# Patient Record
Sex: FEMALE | Race: OTHER | HISPANIC OR LATINO
[De-identification: names, ages, dates, MRNs, and addresses within clinical notes are randomized per-mention and may not be internally consistent; named-entity substitution may affect disease eponyms.]

---

## 2019-03-04 PROBLEM — Z00.00 ENCOUNTER FOR PREVENTIVE HEALTH EXAMINATION: Status: ACTIVE | Noted: 2019-03-04

## 2019-04-04 ENCOUNTER — ASOB RESULT (OUTPATIENT)
Age: 29
End: 2019-04-04

## 2019-04-04 ENCOUNTER — APPOINTMENT (OUTPATIENT)
Dept: ANTEPARTUM | Facility: CLINIC | Age: 29
End: 2019-04-04
Payer: COMMERCIAL

## 2019-04-04 PROCEDURE — 76801 OB US < 14 WKS SINGLE FETUS: CPT

## 2019-04-04 PROCEDURE — 36416 COLLJ CAPILLARY BLOOD SPEC: CPT

## 2019-04-04 PROCEDURE — 76813 OB US NUCHAL MEAS 1 GEST: CPT

## 2019-06-06 ENCOUNTER — APPOINTMENT (OUTPATIENT)
Dept: ANTEPARTUM | Facility: CLINIC | Age: 29
End: 2019-06-06
Payer: COMMERCIAL

## 2019-06-06 ENCOUNTER — ASOB RESULT (OUTPATIENT)
Age: 29
End: 2019-06-06

## 2019-06-06 PROCEDURE — 76811 OB US DETAILED SNGL FETUS: CPT

## 2019-06-11 ENCOUNTER — APPOINTMENT (OUTPATIENT)
Dept: ANTEPARTUM | Facility: CLINIC | Age: 29
End: 2019-06-11
Payer: COMMERCIAL

## 2019-06-11 ENCOUNTER — ASOB RESULT (OUTPATIENT)
Age: 29
End: 2019-06-11

## 2019-06-11 PROCEDURE — 99213 OFFICE O/P EST LOW 20 MIN: CPT | Mod: 25

## 2019-06-11 PROCEDURE — 76816 OB US FOLLOW-UP PER FETUS: CPT

## 2019-07-17 ENCOUNTER — EMERGENCY (EMERGENCY)
Facility: HOSPITAL | Age: 29
LOS: 1 days | Discharge: HOME | End: 2019-07-17
Attending: EMERGENCY MEDICINE | Admitting: EMERGENCY MEDICINE
Payer: COMMERCIAL

## 2019-07-17 VITALS
DIASTOLIC BLOOD PRESSURE: 82 MMHG | HEART RATE: 99 BPM | TEMPERATURE: 98 F | OXYGEN SATURATION: 100 % | SYSTOLIC BLOOD PRESSURE: 146 MMHG | RESPIRATION RATE: 18 BRPM

## 2019-07-17 PROCEDURE — 93010 ELECTROCARDIOGRAM REPORT: CPT

## 2019-07-17 PROCEDURE — 99284 EMERGENCY DEPT VISIT MOD MDM: CPT | Mod: 25

## 2019-07-17 NOTE — ED ADULT TRIAGE NOTE - CHIEF COMPLAINT QUOTE
PT C/O chest pressure x 1 day. Denies SOB, fevers, chills, cough, ABD pain, vaginal bleeding or OBGYN complaints. PMH. States approximately 27 weeks pregnant with DELPHINE: 10/13/19. [facility]. Spoke with L&D triage PA: Pt to be seen in ED.

## 2019-07-18 ENCOUNTER — OUTPATIENT (OUTPATIENT)
Dept: INPATIENT UNIT | Facility: HOSPITAL | Age: 29
LOS: 1 days | Discharge: HOME | End: 2019-07-18
Payer: COMMERCIAL

## 2019-07-18 VITALS
SYSTOLIC BLOOD PRESSURE: 122 MMHG | OXYGEN SATURATION: 100 % | HEART RATE: 78 BPM | TEMPERATURE: 98 F | RESPIRATION RATE: 18 BRPM | DIASTOLIC BLOOD PRESSURE: 71 MMHG

## 2019-07-18 VITALS — DIASTOLIC BLOOD PRESSURE: 66 MMHG | SYSTOLIC BLOOD PRESSURE: 112 MMHG | HEART RATE: 80 BPM

## 2019-07-18 VITALS — SYSTOLIC BLOOD PRESSURE: 134 MMHG | HEART RATE: 82 BPM | DIASTOLIC BLOOD PRESSURE: 68 MMHG

## 2019-07-18 DIAGNOSIS — O26.899 OTHER SPECIFIED PREGNANCY RELATED CONDITIONS, UNSPECIFIED TRIMESTER: ICD-10-CM

## 2019-07-18 DIAGNOSIS — Z3A.00 WEEKS OF GESTATION OF PREGNANCY NOT SPECIFIED: ICD-10-CM

## 2019-07-18 LAB
ALBUMIN SERPL ELPH-MCNC: 3.8 G/DL — SIGNIFICANT CHANGE UP (ref 3.3–5)
ALP SERPL-CCNC: 60 U/L — SIGNIFICANT CHANGE UP (ref 40–120)
ALT FLD-CCNC: 19 U/L — SIGNIFICANT CHANGE UP (ref 4–33)
ANION GAP SERPL CALC-SCNC: 12 MMO/L — SIGNIFICANT CHANGE UP (ref 7–14)
APPEARANCE UR: CLEAR — SIGNIFICANT CHANGE UP
AST SERPL-CCNC: 16 U/L — SIGNIFICANT CHANGE UP (ref 4–32)
BILIRUB SERPL-MCNC: < 0.2 MG/DL — LOW (ref 0.2–1.2)
BILIRUB UR-MCNC: NEGATIVE — SIGNIFICANT CHANGE UP
BLOOD UR QL VISUAL: NEGATIVE — SIGNIFICANT CHANGE UP
BUN SERPL-MCNC: 8 MG/DL — SIGNIFICANT CHANGE UP (ref 7–23)
CALCIUM SERPL-MCNC: 9.7 MG/DL — SIGNIFICANT CHANGE UP (ref 8.4–10.5)
CHLORIDE SERPL-SCNC: 101 MMOL/L — SIGNIFICANT CHANGE UP (ref 98–107)
CO2 SERPL-SCNC: 23 MMOL/L — SIGNIFICANT CHANGE UP (ref 22–31)
COLOR SPEC: SIGNIFICANT CHANGE UP
CREAT SERPL-MCNC: 0.53 MG/DL — SIGNIFICANT CHANGE UP (ref 0.5–1.3)
GLUCOSE SERPL-MCNC: 86 MG/DL — SIGNIFICANT CHANGE UP (ref 70–99)
GLUCOSE UR-MCNC: NEGATIVE — SIGNIFICANT CHANGE UP
HCT VFR BLD CALC: 37.9 % — SIGNIFICANT CHANGE UP (ref 34.5–45)
HGB BLD-MCNC: 12.6 G/DL — SIGNIFICANT CHANGE UP (ref 11.5–15.5)
KETONES UR-MCNC: NEGATIVE — SIGNIFICANT CHANGE UP
LEUKOCYTE ESTERASE UR-ACNC: NEGATIVE — SIGNIFICANT CHANGE UP
MCHC RBC-ENTMCNC: 30.1 PG — SIGNIFICANT CHANGE UP (ref 27–34)
MCHC RBC-ENTMCNC: 33.2 % — SIGNIFICANT CHANGE UP (ref 32–36)
MCV RBC AUTO: 90.7 FL — SIGNIFICANT CHANGE UP (ref 80–100)
NITRITE UR-MCNC: NEGATIVE — SIGNIFICANT CHANGE UP
NRBC # FLD: 0 K/UL — SIGNIFICANT CHANGE UP (ref 0–0)
PH UR: 6.5 — SIGNIFICANT CHANGE UP (ref 5–8)
PLATELET # BLD AUTO: 287 K/UL — SIGNIFICANT CHANGE UP (ref 150–400)
PMV BLD: 10.9 FL — SIGNIFICANT CHANGE UP (ref 7–13)
POTASSIUM SERPL-MCNC: 3.4 MMOL/L — LOW (ref 3.5–5.3)
POTASSIUM SERPL-SCNC: 3.4 MMOL/L — LOW (ref 3.5–5.3)
PROT SERPL-MCNC: 7.4 G/DL — SIGNIFICANT CHANGE UP (ref 6–8.3)
PROT UR-MCNC: 10 — SIGNIFICANT CHANGE UP
RBC # BLD: 4.18 M/UL — SIGNIFICANT CHANGE UP (ref 3.8–5.2)
RBC # FLD: 13 % — SIGNIFICANT CHANGE UP (ref 10.3–14.5)
SODIUM SERPL-SCNC: 136 MMOL/L — SIGNIFICANT CHANGE UP (ref 135–145)
SP GR SPEC: 1.02 — SIGNIFICANT CHANGE UP (ref 1–1.04)
TROPONIN T, HIGH SENSITIVITY: < 6 NG/L — SIGNIFICANT CHANGE UP (ref ?–14)
UROBILINOGEN FLD QL: NORMAL — SIGNIFICANT CHANGE UP
WBC # BLD: 13.57 K/UL — HIGH (ref 3.8–10.5)
WBC # FLD AUTO: 13.57 K/UL — HIGH (ref 3.8–10.5)

## 2019-07-18 PROCEDURE — 59025 FETAL NON-STRESS TEST: CPT | Mod: 26

## 2019-07-18 NOTE — ED PROVIDER NOTE - NSFOLLOWUPCLINICS_GEN_ALL_ED_FT
A Family Medicine Doctor  Family Medicine  .  [place]   Phone:   Fax:   Follow Up Time: 1-3 Days    An OB/GYN physician  Obstetrics & Gynecology  .  [place]   Phone:   Fax:   Follow Up Time: 1-3 Days

## 2019-07-18 NOTE — OB PROVIDER TRIAGE NOTE - NSOBPROVIDERNOTE_OBGYN_ALL_OB_FT
Discussed with Dr. CASTELLANO  -Pt cleared for discharge home  -PTL precautions reviewed  -Pt to follow up with next scheduled appointment  -Verbal and written discharge instructions given Discussed with Dr. Abdi  -Pt cleared for discharge home  -PTL precautions reviewed  -Pt to follow up with next scheduled appointment  -Verbal and written discharge instructions given

## 2019-07-18 NOTE — ED PROVIDER NOTE - PROGRESS NOTE DETAILS
shared decision making with patient and  about cxr, patient would not like cxr, advised on risks and benefits of diagnostic cxr, and understands.  pending remaining labs Patient feels well, tolerating PO. Discussed lab and radiology findings with patient. Patient feels comfortable going home. Gave home care and follow up instructions. Discussed which symptoms to look out for and when to return to the ED for further evaluation. Patient given opportunity to ask questions about their medical condition and had all questions answered. will send to ob for nst

## 2019-07-18 NOTE — ED PROVIDER NOTE - NS ED ROS FT
ROS:  GENERAL: No fever, no chills  EYES: no change in vision  HEENT: no trouble swallowing, no trouble speaking  CARDIAC: + chest pain  PULMONARY: no cough, no shortness of breath  GI: no abdominal pain, no nausea, no vomiting, no diarrhea, no constipation  : No dysuria, no frequency, no change in appearance, or odor of urine  SKIN: no rashes  NEURO: no headache, no weakness  MSK: No joint pain  ~Jason Rushing D.O. -Resident

## 2019-07-18 NOTE — ED PROVIDER NOTE - OBJECTIVE STATEMENT
29 yo f  27wk's lmp dec 19th no pmhx pw chest pain. patient reports acute onset chest pain starting at 8pm in midsternal area. does not radiate and was constant. denies hx of chest pain. not aw Dyspnea or palpitations. Denies fever, chills, sweats, fatigue, weight loss, Alcohol, tobacco, or illicit drug use. Episode lasted approx 1hr in duration. no hx of Vascular or pulmonary disease, history of ulcer, previous hospitalizations or evaluations for chest pain. denies OCP use, recent travel, hx of blood clots, hx of cancer. now pain free

## 2019-07-18 NOTE — ED ADULT NURSE REASSESSMENT NOTE - NS ED NURSE REASSESS COMMENT FT1
break coverage RN- pt awake, a/ox3, vitally stable at time of transport, pt sent to L&D, medically cleared in ED, IV removed by resident prior to leaving, pt denies any CP, SOB prior to transport

## 2019-07-18 NOTE — ED ADULT NURSE NOTE - OBJECTIVE STATEMENT
patient brought into room 25. A&ox4 amb self care female presents to the ed today for intermittent chest pressure x4 hours pt is 27 week pregnant  chest pressure is non radiaiting. pt breathing even and unlabored in nsr on cardiac monitor. 18g iv placed in left ac labs drawn and sent. pt speaking in clear and full sentences

## 2019-07-18 NOTE — ED PROVIDER NOTE - CLINICAL SUMMARY MEDICAL DECISION MAKING FREE TEXT BOX
29 yo f  27wk's lmp dec 19th no pmhx pw chest pain. ekg shows pt on monitor, enzymes, cxr for pneumothorax or infiltrates, less likely PE, fetal nst post eval with ob

## 2019-07-18 NOTE — ED PROVIDER NOTE - PHYSICAL EXAMINATION
Physical Exam:  Gen: NAD, AOx3, non-toxic appearing, able to ambulate without assistance  Head: NCAT  HEENT: EOMI, PEERLA, normal conjunctiva, tongue midline, oral mucosa moist  Lung: CTAB, no respiratory distress, no wheezes/rhonchi/rales B/L, speaking in full sentences  CV: RRR, no murmurs, rubs or gallops, no rashes or chest wall ttp  Abd: soft, NT, ND, no guarding, no rigidity, no rebound tenderness, no CVA tenderness, gravid   MSK: no visible deformities, ROM normal in UE/LE, no back pain  Neuro: No focal sensory or motor deficits  Skin: Warm, well perfused, no rash, no leg swelling  Psych: normal affect, calm  ~Jason Rushing D.O. -Resident

## 2019-07-18 NOTE — OB PROVIDER TRIAGE NOTE - NSHPPHYSICALEXAM_GEN_ALL_CORE
VSS  Abdomen: Soft, non-tender on palpation  NST:  with moderate variability, accelerations, no deceleration  [place]: No ctx noted on toco

## 2019-07-18 NOTE — OB PROVIDER TRIAGE NOTE - HISTORY OF PRESENT ILLNESS
Pt of Dr. Swanson 27 y/o  presents to triage from the ED for OB clearance. Pt denies Ctx, Lof, Vb. Pt reports good fetal movement.  Current Ap course uncomplicated  Medical H/x: Denies  Surgical H/x: Denies  Ob/Gyn H/x: Denies  Psych H/x: Denies  Meds: Pnv  NKDA

## 2019-07-18 NOTE — ED PROVIDER NOTE - ATTENDING CONTRIBUTION TO CARE
29 yo G1 @ 27 weks presenting with sudden onset of midsternal CP that was tight and constant.  Resolved after about 3 hours.  No associated SOB and was not pleuritic.   No history of same.  Pain free at the current time    Gen: Well appearing in NAD  Head: NC/AT  Neck: trachea midline  Resp:  No distress  Ext: no deformities  Neuro:  A&O appears non focal  Skin:  Warm and dry as visualized  Psych:  Normal affect and mood     Pt with episode of CP.  Not consistent with an ACS.  Pt is pregnant but presentation is not consistent with a PE.  will get a trop as well as EKG (non ischemic).  GI is a likely etiology.  Will treat symptomatically.  Pt does not want CxR in setting of being pregnant.  Will refer to OB for NST

## 2019-07-18 NOTE — ED PROVIDER NOTE - NSFOLLOWUPINSTRUCTIONS_ED_ALL_ED_FT
Chest Pain    Chest pain can be caused by many different conditions which may or may not be dangerous. Causes include heartburn, lung infections, heart attack, blood clot in lungs, skin infections, strain or damage to muscle, cartilage, or bones, etc. In addition to a history and physical examination, an electrocardiogram (ECG) or other lab tests may have been performed to determine the cause of your chest pain. Follow up with your primary care provider or with a cardiologist as instructed.     SEEK IMMEDIATE MEDICAL CARE IF YOU HAVE ANY OF THE FOLLOWING SYMPTOMS: worsening chest pain, coughing up blood, unexplained back/neck/jaw pain, severe abdominal pain, dizziness or lightheadedness, fainting, shortness of breath, sweaty or clammy skin, vomiting, or racing heart beat. These symptoms may represent a serious problem that is an emergency. Do not wait to see if the symptoms will go away. Get medical help right away. Call 911 and do not drive yourself to the hospital.    1. TAKE ALL MEDICATIONS AS DIRECTED.    2. FOR PAIN OR FEVER YOU CAN TAKE ACETAMINOPHEN (TYLENOL) AS NEEDED, AS DIRECTED ON PACKAGING.  3. FOLLOW UP WITH YOUR PRIMARY DOCTOR/OBGYN WITHIN 5 DAYS AS DIRECTED.  4. IF YOU HAD LABS OR IMAGING DONE, YOU WERE GIVEN COPIES OF ALL LABS AND/OR IMAGING RESULTS FROM YOUR ER VISIT--PLEASE TAKE THEM WITH YOU TO YOUR FOLLOW UP APPOINTMENTS.  5. IF NEEDED, CALL PATIENT ACCESS SERVICES AT [phone] TO FIND A PRIMARY CARE PHYSICIAN.  OR CALL [phone] TO MAKE AN APPOINTMENT WITH THE CLINIC.  6. RETURN TO THE ER FOR ANY WORSENING SYMPTOMS OR CONCERNS.

## 2019-07-19 LAB
BACTERIA UR CULT: SIGNIFICANT CHANGE UP
SPECIMEN SOURCE: SIGNIFICANT CHANGE UP

## 2019-08-19 ENCOUNTER — APPOINTMENT (OUTPATIENT)
Dept: ANTEPARTUM | Facility: CLINIC | Age: 29
End: 2019-08-19
Payer: COMMERCIAL

## 2019-08-19 ENCOUNTER — ASOB RESULT (OUTPATIENT)
Age: 29
End: 2019-08-19

## 2019-08-19 PROCEDURE — 76816 OB US FOLLOW-UP PER FETUS: CPT

## 2019-08-19 PROCEDURE — 76819 FETAL BIOPHYS PROFIL W/O NST: CPT

## 2019-09-12 PROBLEM — Z78.9 OTHER SPECIFIED HEALTH STATUS: Chronic | Status: ACTIVE | Noted: 2019-07-18

## 2019-09-15 ENCOUNTER — OUTPATIENT (OUTPATIENT)
Dept: INPATIENT UNIT | Facility: HOSPITAL | Age: 29
LOS: 1 days | Discharge: HOME | End: 2019-09-15
Payer: COMMERCIAL

## 2019-09-15 VITALS
DIASTOLIC BLOOD PRESSURE: 74 MMHG | TEMPERATURE: 98 F | RESPIRATION RATE: 14 BRPM | SYSTOLIC BLOOD PRESSURE: 153 MMHG | HEART RATE: 120 BPM

## 2019-09-15 VITALS — HEART RATE: 89 BPM | OXYGEN SATURATION: 96 %

## 2019-09-15 DIAGNOSIS — O26.899 OTHER SPECIFIED PREGNANCY RELATED CONDITIONS, UNSPECIFIED TRIMESTER: ICD-10-CM

## 2019-09-15 DIAGNOSIS — Z3A.00 WEEKS OF GESTATION OF PREGNANCY NOT SPECIFIED: ICD-10-CM

## 2019-09-15 LAB
ALBUMIN SERPL ELPH-MCNC: 3.5 G/DL — SIGNIFICANT CHANGE UP (ref 3.3–5)
ALP SERPL-CCNC: 82 U/L — SIGNIFICANT CHANGE UP (ref 40–120)
ALT FLD-CCNC: 21 U/L — SIGNIFICANT CHANGE UP (ref 4–33)
ANION GAP SERPL CALC-SCNC: 10 MMO/L — SIGNIFICANT CHANGE UP (ref 7–14)
APPEARANCE UR: SIGNIFICANT CHANGE UP
APTT BLD: 29 SEC — SIGNIFICANT CHANGE UP (ref 27.5–36.3)
AST SERPL-CCNC: 18 U/L — SIGNIFICANT CHANGE UP (ref 4–32)
B PERT DNA SPEC QL NAA+PROBE: NOT DETECTED — SIGNIFICANT CHANGE UP
BACTERIA # UR AUTO: NEGATIVE — SIGNIFICANT CHANGE UP
BASOPHILS # BLD AUTO: 0.02 K/UL — SIGNIFICANT CHANGE UP (ref 0–0.2)
BASOPHILS NFR BLD AUTO: 0.2 % — SIGNIFICANT CHANGE UP (ref 0–2)
BILIRUB SERPL-MCNC: < 0.2 MG/DL — LOW (ref 0.2–1.2)
BILIRUB UR-MCNC: NEGATIVE — SIGNIFICANT CHANGE UP
BLOOD UR QL VISUAL: NEGATIVE — SIGNIFICANT CHANGE UP
BUN SERPL-MCNC: 6 MG/DL — LOW (ref 7–23)
C PNEUM DNA SPEC QL NAA+PROBE: NOT DETECTED — SIGNIFICANT CHANGE UP
CALCIUM SERPL-MCNC: 9.5 MG/DL — SIGNIFICANT CHANGE UP (ref 8.4–10.5)
CHLORIDE SERPL-SCNC: 102 MMOL/L — SIGNIFICANT CHANGE UP (ref 98–107)
CO2 SERPL-SCNC: 25 MMOL/L — SIGNIFICANT CHANGE UP (ref 22–31)
COLOR SPEC: SIGNIFICANT CHANGE UP
CREAT ?TM UR-MCNC: 63.9 MG/DL — SIGNIFICANT CHANGE UP
CREAT SERPL-MCNC: 0.57 MG/DL — SIGNIFICANT CHANGE UP (ref 0.5–1.3)
EOSINOPHIL # BLD AUTO: 0.05 K/UL — SIGNIFICANT CHANGE UP (ref 0–0.5)
EOSINOPHIL NFR BLD AUTO: 0.5 % — SIGNIFICANT CHANGE UP (ref 0–6)
FIBRINOGEN PPP-MCNC: 862.5 MG/DL — HIGH (ref 350–510)
FLUAV H1 2009 PAND RNA SPEC QL NAA+PROBE: NOT DETECTED — SIGNIFICANT CHANGE UP
FLUAV H1 RNA SPEC QL NAA+PROBE: NOT DETECTED — SIGNIFICANT CHANGE UP
FLUAV H3 RNA SPEC QL NAA+PROBE: NOT DETECTED — SIGNIFICANT CHANGE UP
FLUAV SUBTYP SPEC NAA+PROBE: NOT DETECTED — SIGNIFICANT CHANGE UP
FLUBV RNA SPEC QL NAA+PROBE: NOT DETECTED — SIGNIFICANT CHANGE UP
GLUCOSE SERPL-MCNC: 90 MG/DL — SIGNIFICANT CHANGE UP (ref 70–99)
GLUCOSE UR-MCNC: NEGATIVE — SIGNIFICANT CHANGE UP
HADV DNA SPEC QL NAA+PROBE: NOT DETECTED — SIGNIFICANT CHANGE UP
HCOV PNL SPEC NAA+PROBE: SIGNIFICANT CHANGE UP
HCT VFR BLD CALC: 40.9 % — SIGNIFICANT CHANGE UP (ref 34.5–45)
HGB BLD-MCNC: 13.3 G/DL — SIGNIFICANT CHANGE UP (ref 11.5–15.5)
HMPV RNA SPEC QL NAA+PROBE: NOT DETECTED — SIGNIFICANT CHANGE UP
HPIV1 RNA SPEC QL NAA+PROBE: NOT DETECTED — SIGNIFICANT CHANGE UP
HPIV2 RNA SPEC QL NAA+PROBE: NOT DETECTED — SIGNIFICANT CHANGE UP
HPIV3 RNA SPEC QL NAA+PROBE: NOT DETECTED — SIGNIFICANT CHANGE UP
HPIV4 RNA SPEC QL NAA+PROBE: NOT DETECTED — SIGNIFICANT CHANGE UP
HYALINE CASTS # UR AUTO: NEGATIVE — SIGNIFICANT CHANGE UP
IMM GRANULOCYTES NFR BLD AUTO: 0.7 % — SIGNIFICANT CHANGE UP (ref 0–1.5)
INR BLD: 1.03 — SIGNIFICANT CHANGE UP (ref 0.88–1.17)
KETONES UR-MCNC: NEGATIVE — SIGNIFICANT CHANGE UP
LDH SERPL L TO P-CCNC: 164 U/L — SIGNIFICANT CHANGE UP (ref 135–225)
LEUKOCYTE ESTERASE UR-ACNC: SIGNIFICANT CHANGE UP
LYMPHOCYTES # BLD AUTO: 2.2 K/UL — SIGNIFICANT CHANGE UP (ref 1–3.3)
LYMPHOCYTES # BLD AUTO: 20.5 % — SIGNIFICANT CHANGE UP (ref 13–44)
MCHC RBC-ENTMCNC: 28.5 PG — SIGNIFICANT CHANGE UP (ref 27–34)
MCHC RBC-ENTMCNC: 32.5 % — SIGNIFICANT CHANGE UP (ref 32–36)
MCV RBC AUTO: 87.6 FL — SIGNIFICANT CHANGE UP (ref 80–100)
MONOCYTES # BLD AUTO: 0.94 K/UL — HIGH (ref 0–0.9)
MONOCYTES NFR BLD AUTO: 8.8 % — SIGNIFICANT CHANGE UP (ref 2–14)
NEUTROPHILS # BLD AUTO: 7.44 K/UL — HIGH (ref 1.8–7.4)
NEUTROPHILS NFR BLD AUTO: 69.3 % — SIGNIFICANT CHANGE UP (ref 43–77)
NITRITE UR-MCNC: NEGATIVE — SIGNIFICANT CHANGE UP
NRBC # FLD: 0 K/UL — SIGNIFICANT CHANGE UP (ref 0–0)
PH UR: 7 — SIGNIFICANT CHANGE UP (ref 5–8)
PLATELET # BLD AUTO: 261 K/UL — SIGNIFICANT CHANGE UP (ref 150–400)
PMV BLD: 11.5 FL — SIGNIFICANT CHANGE UP (ref 7–13)
POTASSIUM SERPL-MCNC: 4.1 MMOL/L — SIGNIFICANT CHANGE UP (ref 3.5–5.3)
POTASSIUM SERPL-SCNC: 4.1 MMOL/L — SIGNIFICANT CHANGE UP (ref 3.5–5.3)
PROT SERPL-MCNC: 6.9 G/DL — SIGNIFICANT CHANGE UP (ref 6–8.3)
PROT UR-MCNC: 10 — SIGNIFICANT CHANGE UP
PROT UR-MCNC: 13.5 MG/DL — SIGNIFICANT CHANGE UP
PROTHROM AB SERPL-ACNC: 11.4 SEC — SIGNIFICANT CHANGE UP (ref 9.8–13.1)
RBC # BLD: 4.67 M/UL — SIGNIFICANT CHANGE UP (ref 3.8–5.2)
RBC # FLD: 13.8 % — SIGNIFICANT CHANGE UP (ref 10.3–14.5)
RBC CASTS # UR COMP ASSIST: SIGNIFICANT CHANGE UP (ref 0–?)
RSV RNA SPEC QL NAA+PROBE: NOT DETECTED — SIGNIFICANT CHANGE UP
RV+EV RNA SPEC QL NAA+PROBE: NOT DETECTED — SIGNIFICANT CHANGE UP
SODIUM SERPL-SCNC: 137 MMOL/L — SIGNIFICANT CHANGE UP (ref 135–145)
SP GR SPEC: 1.01 — SIGNIFICANT CHANGE UP (ref 1–1.04)
SQUAMOUS # UR AUTO: SIGNIFICANT CHANGE UP
URATE SERPL-MCNC: 3.2 MG/DL — SIGNIFICANT CHANGE UP (ref 2.5–7)
UROBILINOGEN FLD QL: NORMAL — SIGNIFICANT CHANGE UP
WBC # BLD: 10.73 K/UL — HIGH (ref 3.8–10.5)
WBC # FLD AUTO: 10.73 K/UL — HIGH (ref 3.8–10.5)
WBC UR QL: HIGH (ref 0–?)

## 2019-09-15 PROCEDURE — 59025 FETAL NON-STRESS TEST: CPT | Mod: 26

## 2019-09-15 PROCEDURE — 76818 FETAL BIOPHYS PROFILE W/NST: CPT | Mod: 26

## 2019-09-15 PROCEDURE — 99214 OFFICE O/P EST MOD 30 MIN: CPT

## 2019-09-15 RX ORDER — SODIUM CHLORIDE 9 MG/ML
3 INJECTION INTRAMUSCULAR; INTRAVENOUS; SUBCUTANEOUS EVERY 8 HOURS
Refills: 0 | Status: DISCONTINUED | OUTPATIENT
Start: 2019-09-15 | End: 2019-10-06

## 2019-09-15 NOTE — OB PROVIDER TRIAGE NOTE - ADDITIONAL INSTRUCTIONS
Please drink 1-2 liters of fluid per day. Please make an appointment to be seen in your prenatal office tomorrow 9/16/19 or 9/17/19.

## 2019-09-15 NOTE — OB PROVIDER TRIAGE NOTE - NSOBPROVIDERNOTE_OBGYN_ALL_OB_FT
28 y.o.  DELPHINE 10/13/20 @ 36 weeks presents with c/o vaginal spotting when wiping x 3 days. Pt denies ctx, LOF, fever, chills, nausea, vomting, dysria. Pt states + FM and uncomplicated antepartum course. Pt states significant other had respiratory infection last week with fever x 1 day.    allergies:  NKDA  medications:  prenatal vitamins    med/surg/OBGYN hx:  denies    rectal temp 36.4  abdomen soft, nontender  negative suprapubic tenderness  taus: sliup, cephalic presentation, posterior placenta, bpp 8/8, luis 20 efw 3376 grams  sse: no bleeding noted, negative pooling/nitrazine/ferning  sve: 0/50/-3/I  nst in progress    HELLP labs pending, rapid respiratory panel pending 28 y.o.  DELPHINE 10/13/20 @ 36 weeks presents with c/o vaginal spotting when wiping x 3 days. Pt denies ctx, LOF, fever, chills, nausea, vomting, dysria. Pt states + FM and uncomplicated antepartum course. Pt states significant other had respiratory infection last week with fever x 1 day.    allergies:  NKDA  medications:  prenatal vitamins    med/surg/OBGYN hx:  denies    rectal temp 36.4  abdomen soft, nontender  negative suprapubic tenderness  taus: sliup, cephalic presentation, posterior placenta, bpp 8/8, luis 20 efw 3376 grams  sse: no bleeding noted, negative pooling/nitrazine/ferning  sve: 0/50/-3/I  nst in progress    HELLP labs pending, rapid respiratory panel pending    1312    10.7 > 13.3/ 40.9 <261    fibrinogen 862  serum creatinine 0.5  ast/alt 18/21  uric acid 3.2      UA  protien 10  PCR 0.2 28 y.o.  DELPHINE 10/13/20 @ 36 weeks presents with c/o vaginal spotting when wiping x 3 days. Pt denies ctx, LOF, fever, chills, nausea, vomting, dysria. Pt states + FM and uncomplicated antepartum course. Pt states significant other had respiratory infection last week with fever x 1 day.    allergies:  NKDA  medications:  prenatal vitamins    med/surg/OBGYN hx:  denies    rectal temp 36.4  abdomen soft, nontender  negative suprapubic tenderness  taus: sliup, cephalic presentation, posterior placenta, bpp 8/8, luis 20 efw 3376 grams  sse: no bleeding noted, negative pooling/nitrazine/ferning  sve: 0/50/-3/I  nst in progress    HELLP labs pending, rapid respiratory panel pending    1312    10.7 > 13.3/ 40.9 <261    fibrinogen 862  serum creatinine 0.5  ast/alt 18/21  uric acid 3.2      UA  protein 10  PCR 0.2    nst reactive  toco: no ctx noted 28 y.o.  DELPHINE 10/13/20 @ 36 weeks presents with c/o vaginal spotting when wiping x 3 days. Pt denies ctx, LOF, fever, chills, nausea, vomting, dysuria. Pt states + FM and uncomplicated antepartum course. Pt states significant other had respiratory infection last week with fever x 1 day.    allergies:  NKDA  medications:  prenatal vitamins    med/surg/OBGYN hx:  denies    rectal temp 36.4  abdomen soft, nontender  negative suprapubic tenderness  taus: sliup, cephalic presentation, posterior placenta, bpp 8/8, luis 20 efw 3376 grams  sse: no bleeding noted, negative pooling/nitrazine/ferning  sve: 0/50/-3/I  nst in progress    HELLP labs pending, rapid respiratory panel pending    1312    10.7 > 13.3/ 40.9 <261    fibrinogen 862  serum creatinine 0.5  ast/alt 18/21  uric acid 3.2      UA  protein 10  PCR 0.2    nst reactive  toco: no ctx noted 28 y.o.  DELPHINE 10/13/20 @ 36 weeks presents with c/o vaginal spotting when wiping x 3 days. Pt denies ctx, LOF, fever, chills, nausea, vomting, dysuria, sexual intercourse in past . Pt states + FM and uncomplicated antepartum course. Pt states significant other had respiratory infection last week with fever x 1 day.    allergies:  NKDA  medications:  prenatal vitamins    med/surg/OBGYN hx:  denies    rectal temp 36.4  abdomen soft, nontender  negative suprapubic tenderness  taus: sliup, cephalic presentation, posterior placenta, bpp 8/8, luis 20 efw 3376 grams  sse: no bleeding noted, negative pooling/nitrazine/ferning  sve: 0/50/-3/I  nst in progress    HELLP labs pending, rapid respiratory panel pending    1312    10.7 > 13.3/ 40.9 <261    fibrinogen 862  serum creatinine 0.5  ast/alt   uric acid 3.2      UA  protein 10  PCR 0.2    nst reactive  toco: no ctx noted    1457    RRP negative    no evidence of vaginal bleeding at this time.   discussed with Dr King. Pt discharged home with  labor precautions/fetal kick counts reviewed. Encouraged increased PO hydration. F/U with prenatal appointment on 19 or 19.    SOFIA Nazario

## 2019-09-15 NOTE — OB PROVIDER TRIAGE NOTE - HISTORY OF PRESENT ILLNESS
28 y.o.  DELPHINE 10/13/20 @ 36 weeks presents with c/o vaginal spotting when wiping x 3 days. Pt denies ctx, LOF, fever, chills, nausea, vomting, dysria. Pt states + FM and uncomplicated antepartum course. 28 y.o.  DELPHINE 10/13/20 @ 36 weeks presents with c/o vaginal spotting when wiping x 3 days. Pt denies ctx, LOF, fever, chills, nausea, vomting, dysuria. Pt states + FM and uncomplicated antepartum course.

## 2019-09-15 NOTE — OB PROVIDER TRIAGE NOTE - NSHPPHYSICALEXAM_GEN_ALL_CORE
abdomen soft, nontender  negative suprapubic tenderness  taus: sliup, cephalic presentation, posterior placenta, bpp 8/8, luis 20 efw 3376 grams  sse: no bleeding noted, negative pooling/nitrazine/ferning  sve: 0/50/-3/I  nst in progress

## 2019-09-16 ENCOUNTER — APPOINTMENT (OUTPATIENT)
Dept: ANTEPARTUM | Facility: CLINIC | Age: 29
End: 2019-09-16
Payer: COMMERCIAL

## 2019-09-16 ENCOUNTER — ASOB RESULT (OUTPATIENT)
Age: 29
End: 2019-09-16

## 2019-09-16 PROCEDURE — 76819 FETAL BIOPHYS PROFIL W/O NST: CPT

## 2019-09-16 PROCEDURE — 76816 OB US FOLLOW-UP PER FETUS: CPT

## 2019-10-08 ENCOUNTER — INPATIENT (INPATIENT)
Facility: HOSPITAL | Age: 29
LOS: 2 days | Discharge: HOME | End: 2019-10-11
Attending: OBSTETRICS & GYNECOLOGY | Admitting: OBSTETRICS & GYNECOLOGY

## 2019-10-08 VITALS
TEMPERATURE: 99 F | DIASTOLIC BLOOD PRESSURE: 100 MMHG | RESPIRATION RATE: 16 BRPM | HEART RATE: 109 BPM | SYSTOLIC BLOOD PRESSURE: 140 MMHG

## 2019-10-08 DIAGNOSIS — O26.899 OTHER SPECIFIED PREGNANCY RELATED CONDITIONS, UNSPECIFIED TRIMESTER: ICD-10-CM

## 2019-10-08 DIAGNOSIS — Z3A.00 WEEKS OF GESTATION OF PREGNANCY NOT SPECIFIED: ICD-10-CM

## 2019-10-08 LAB
ALBUMIN SERPL ELPH-MCNC: 3.5 G/DL — SIGNIFICANT CHANGE UP (ref 3.3–5)
ALP SERPL-CCNC: 106 U/L — SIGNIFICANT CHANGE UP (ref 40–120)
ALT FLD-CCNC: 14 U/L — SIGNIFICANT CHANGE UP (ref 4–33)
ANION GAP SERPL CALC-SCNC: 12 MMO/L — SIGNIFICANT CHANGE UP (ref 7–14)
APPEARANCE UR: SIGNIFICANT CHANGE UP
APTT BLD: 29.8 SEC — SIGNIFICANT CHANGE UP (ref 27.5–36.3)
AST SERPL-CCNC: 19 U/L — SIGNIFICANT CHANGE UP (ref 4–32)
BACTERIA # UR AUTO: SIGNIFICANT CHANGE UP
BASOPHILS # BLD AUTO: 0.01 K/UL — SIGNIFICANT CHANGE UP (ref 0–0.2)
BASOPHILS NFR BLD AUTO: 0.1 % — SIGNIFICANT CHANGE UP (ref 0–2)
BILIRUB SERPL-MCNC: < 0.2 MG/DL — LOW (ref 0.2–1.2)
BILIRUB UR-MCNC: NEGATIVE — SIGNIFICANT CHANGE UP
BLD GP AB SCN SERPL QL: NEGATIVE — SIGNIFICANT CHANGE UP
BLOOD UR QL VISUAL: SIGNIFICANT CHANGE UP
BUN SERPL-MCNC: 10 MG/DL — SIGNIFICANT CHANGE UP (ref 7–23)
CALCIUM SERPL-MCNC: 9.6 MG/DL — SIGNIFICANT CHANGE UP (ref 8.4–10.5)
CHLORIDE SERPL-SCNC: 102 MMOL/L — SIGNIFICANT CHANGE UP (ref 98–107)
CO2 SERPL-SCNC: 20 MMOL/L — LOW (ref 22–31)
COLOR SPEC: SIGNIFICANT CHANGE UP
CREAT ?TM UR-MCNC: 38.3 MG/DL — SIGNIFICANT CHANGE UP
CREAT SERPL-MCNC: 0.58 MG/DL — SIGNIFICANT CHANGE UP (ref 0.5–1.3)
EOSINOPHIL # BLD AUTO: 0.05 K/UL — SIGNIFICANT CHANGE UP (ref 0–0.5)
EOSINOPHIL NFR BLD AUTO: 0.5 % — SIGNIFICANT CHANGE UP (ref 0–6)
EPI CELLS # UR: SIGNIFICANT CHANGE UP
FIBRINOGEN PPP-MCNC: > 872 MG/DL — HIGH (ref 350–510)
GLUCOSE SERPL-MCNC: 82 MG/DL — SIGNIFICANT CHANGE UP (ref 70–99)
GLUCOSE UR-MCNC: NEGATIVE — SIGNIFICANT CHANGE UP
HCT VFR BLD CALC: 42.7 % — SIGNIFICANT CHANGE UP (ref 34.5–45)
HGB BLD-MCNC: 13.7 G/DL — SIGNIFICANT CHANGE UP (ref 11.5–15.5)
IMM GRANULOCYTES NFR BLD AUTO: 0.5 % — SIGNIFICANT CHANGE UP (ref 0–1.5)
INR BLD: 0.95 — SIGNIFICANT CHANGE UP (ref 0.88–1.17)
KETONES UR-MCNC: NEGATIVE — SIGNIFICANT CHANGE UP
LDH SERPL L TO P-CCNC: 182 U/L — SIGNIFICANT CHANGE UP (ref 135–225)
LEUKOCYTE ESTERASE UR-ACNC: HIGH
LYMPHOCYTES # BLD AUTO: 2.13 K/UL — SIGNIFICANT CHANGE UP (ref 1–3.3)
LYMPHOCYTES # BLD AUTO: 21.4 % — SIGNIFICANT CHANGE UP (ref 13–44)
MCHC RBC-ENTMCNC: 27.9 PG — SIGNIFICANT CHANGE UP (ref 27–34)
MCHC RBC-ENTMCNC: 32.1 % — SIGNIFICANT CHANGE UP (ref 32–36)
MCV RBC AUTO: 87 FL — SIGNIFICANT CHANGE UP (ref 80–100)
MONOCYTES # BLD AUTO: 0.81 K/UL — SIGNIFICANT CHANGE UP (ref 0–0.9)
MONOCYTES NFR BLD AUTO: 8.1 % — SIGNIFICANT CHANGE UP (ref 2–14)
NEUTROPHILS # BLD AUTO: 6.91 K/UL — SIGNIFICANT CHANGE UP (ref 1.8–7.4)
NEUTROPHILS NFR BLD AUTO: 69.4 % — SIGNIFICANT CHANGE UP (ref 43–77)
NITRITE UR-MCNC: NEGATIVE — SIGNIFICANT CHANGE UP
NRBC # FLD: 0 K/UL — SIGNIFICANT CHANGE UP (ref 0–0)
PH UR: 6.5 — SIGNIFICANT CHANGE UP (ref 5–8)
PLATELET # BLD AUTO: 236 K/UL — SIGNIFICANT CHANGE UP (ref 150–400)
PMV BLD: 11.5 FL — SIGNIFICANT CHANGE UP (ref 7–13)
POTASSIUM SERPL-MCNC: 3.8 MMOL/L — SIGNIFICANT CHANGE UP (ref 3.5–5.3)
POTASSIUM SERPL-SCNC: 3.8 MMOL/L — SIGNIFICANT CHANGE UP (ref 3.5–5.3)
PROT SERPL-MCNC: 7.1 G/DL — SIGNIFICANT CHANGE UP (ref 6–8.3)
PROT UR-MCNC: 11.2 MG/DL — SIGNIFICANT CHANGE UP
PROT UR-MCNC: 30 — SIGNIFICANT CHANGE UP
PROTHROM AB SERPL-ACNC: 10.8 SEC — SIGNIFICANT CHANGE UP (ref 9.8–13.1)
RBC # BLD: 4.91 M/UL — SIGNIFICANT CHANGE UP (ref 3.8–5.2)
RBC # FLD: 14.6 % — HIGH (ref 10.3–14.5)
RBC CASTS # UR COMP ASSIST: SIGNIFICANT CHANGE UP (ref 0–?)
RH IG SCN BLD-IMP: POSITIVE — SIGNIFICANT CHANGE UP
SODIUM SERPL-SCNC: 134 MMOL/L — LOW (ref 135–145)
SP GR SPEC: 1.01 — SIGNIFICANT CHANGE UP (ref 1–1.04)
T PALLIDUM AB TITR SER: NEGATIVE — SIGNIFICANT CHANGE UP
URATE SERPL-MCNC: 4.5 MG/DL — SIGNIFICANT CHANGE UP (ref 2.5–7)
UROBILINOGEN FLD QL: NORMAL — SIGNIFICANT CHANGE UP
WBC # BLD: 9.96 K/UL — SIGNIFICANT CHANGE UP (ref 3.8–10.5)
WBC # FLD AUTO: 9.96 K/UL — SIGNIFICANT CHANGE UP (ref 3.8–10.5)
WBC UR QL: SIGNIFICANT CHANGE UP (ref 0–?)

## 2019-10-08 RX ORDER — OXYTOCIN 10 UNIT/ML
2 VIAL (ML) INJECTION
Qty: 30 | Refills: 0 | Status: DISCONTINUED | OUTPATIENT
Start: 2019-10-08 | End: 2019-10-09

## 2019-10-08 RX ORDER — SODIUM CHLORIDE 9 MG/ML
1000 INJECTION, SOLUTION INTRAVENOUS
Refills: 0 | Status: DISCONTINUED | OUTPATIENT
Start: 2019-10-08 | End: 2019-10-08

## 2019-10-08 RX ORDER — SODIUM CHLORIDE 9 MG/ML
1000 INJECTION INTRAMUSCULAR; INTRAVENOUS; SUBCUTANEOUS
Refills: 0 | Status: DISCONTINUED | OUTPATIENT
Start: 2019-10-08 | End: 2019-10-10

## 2019-10-08 RX ORDER — OXYTOCIN 10 UNIT/ML
333.33 VIAL (ML) INJECTION
Qty: 20 | Refills: 0 | Status: DISCONTINUED | OUTPATIENT
Start: 2019-10-08 | End: 2019-10-08

## 2019-10-08 RX ORDER — INFLUENZA VIRUS VACCINE 15; 15; 15; 15 UG/.5ML; UG/.5ML; UG/.5ML; UG/.5ML
0.5 SUSPENSION INTRAMUSCULAR ONCE
Refills: 0 | Status: COMPLETED | OUTPATIENT
Start: 2019-10-08 | End: 2019-10-11

## 2019-10-08 RX ORDER — SODIUM CHLORIDE 9 MG/ML
500 INJECTION INTRAMUSCULAR; INTRAVENOUS; SUBCUTANEOUS ONCE
Refills: 0 | Status: DISCONTINUED | OUTPATIENT
Start: 2019-10-08 | End: 2019-10-10

## 2019-10-08 RX ORDER — OXYTOCIN 10 UNIT/ML
333.33 VIAL (ML) INJECTION
Qty: 20 | Refills: 0 | Status: DISCONTINUED | OUTPATIENT
Start: 2019-10-08 | End: 2019-10-09

## 2019-10-08 RX ADMIN — SODIUM CHLORIDE 125 MILLILITER(S): 9 INJECTION, SOLUTION INTRAVENOUS at 10:39

## 2019-10-08 RX ADMIN — Medication 2 MILLIUNIT(S)/MIN: at 20:54

## 2019-10-08 NOTE — CHART NOTE - NSCHARTNOTEFT_GEN_A_CORE
NP note late entry due to clinical duties    Evaluated pt sp 3 minute deceleration post epidural    VS: 140s-70s  /mod prieto/+ accels/3 min prolonged decel  [place] q2-4min  SVE 3/80/-3 pt repositioned with improvement in tracing    -For pitocin augmentation once cat I tracing established  -above d/w Dr. Joseph givens, NP

## 2019-10-08 NOTE — CHART NOTE - NSCHARTNOTEFT_GEN_A_CORE
R1 Labor Note    S: Patient evaluated at bedside for late decel after starting pitocin. Patient positioned on her left side with O2 in place and IVF bolus running; pitocin off. Terb drawn at bedside but not administered.     O:  T(C): 37.1 (10-08-19 @ 19:17), Max: 37.7 (10-08-19 @ 16:45)  HR: 121 (10-08-19 @ 21:11) (95 - 144)  BP: 135/75 (10-08-19 @ 20:59) (109/63 - 180/88)  RR: 16 (10-08-19 @ 09:49) (16 - 16)  SpO2: 100% (10-08-19 @ 21:11) (97% - 100%)    SVE: 5/90/-1  EFM: 140 bpm, mod prieto, +accels, late decel (cynthia 90bpm, lasting 7min)  [place]:  cx q1-2min      A/P 29y P0 @39.2wks admitted for IOL for gHTN and PROM.    -IOL: pitocin temporarily d/c'ed due to late decel; will restart 1:1 when tracing recovers; s/p PO  -Cat 2 tracing - IUPC and ISE in place  -GBS neg  -EFW 3345g    d/w Dr. Mercer Robyn Frankel PGY1

## 2019-10-08 NOTE — OB PROVIDER TRIAGE NOTE - NSHPPHYSICALEXAM_GEN_ALL_CORE
Abdomen soft, non tender    SVE  SSE  TAS Vital Signs Last 24 Hrs  T(C): 37 (08 Oct 2019 07:44), Max: 37 (08 Oct 2019 07:44)  T(F): 98.6 (08 Oct 2019 07:44), Max: 98.6 (08 Oct 2019 07:44)  HR: 105 (08 Oct 2019 08:21) (105 - 109)  BP: 152/98 (08 Oct 2019 08:21) (140/100 - 152/98)  BP(mean): --  RR: 16 (08 Oct 2019 07:44) (16 - 16)  SpO2: --    Abdomen soft, non tender  SVE: 1/50/-3  TAS: cephalic presentation Vital Signs Last 24 Hrs  T(C): 37 (08 Oct 2019 07:44), Max: 37 (08 Oct 2019 07:44)  T(F): 98.6 (08 Oct 2019 07:44), Max: 98.6 (08 Oct 2019 07:44)  HR: 105 (08 Oct 2019 08:21) (105 - 109)  BP: 152/98 (08 Oct 2019 08:21) (140/100 - 152/98)  BP(mean): --  RR: 16 (08 Oct 2019 07:44) (16 - 16)  SpO2: --    Abdomen soft, non tender  SVE: 1/50/-3  +pooling   TAS: cephalic presentation  EFW 3345 gm by Leopold's

## 2019-10-08 NOTE — OB PROVIDER TRIAGE NOTE - NSOBPROVIDERNOTE_OBGYN_ALL_OB_FT
HPI  Physical Exam    NKDA  med/surg hx denies  GYN hx denies  OB hx:  Meds: PNV    CAT ___ tracing  TOCO every ___ mins    GBS  19    d/w ____ MD  Admit l&d for PROM @39.2 weeks gestation for IOL PO cytotec  See admission orders 28 y/o 39.2 weeks  with complaints of LOF clear and odorless since 0530 am. pt reports increase pelvic pressure. pt denies bleeding. pt denies n/v/d, fever or chills. pt reports good fetal movement at this time.  AP uncomplicated at this time.   Fetal alert for fluid around kidneys    NKDA  med/surg hx denies  GYN hx denies  OB hx denies  Meds:   PNV    Abdomen soft, non tender  SVE: /-3  TAS: Cephalic presentation     CAT 1 tracing   TOCO contractions 3-4 minutes     GBS negative     d/w ____ MD  Admit l&d for PROM @39.2 weeks gestation for IOL PO cytotec  See admission orders 30 y/o 39.2 weeks  with complaints of LOF clear and odorless since 0530 am. pt reports increase pelvic pressure. pt denies bleeding. pt denies n/v/d, fever or chills. pt reports good fetal movement at this time.  AP uncomplicated at this time.   Fetal alert for fluid around kidneys    NKDA  med/surg hx denies  GYN hx denies  OB hx denies  Meds:   PNV    Abdomen soft, non tender  SVE: /-3  +pooling  TAS: Cephalic presentation     CAT 1 tracing   TOCO contractions 3-4 minutes     GBS negative 2019    d/w Dr Ruiz   Admit l&d for PROM @ 39.2 weeks for PO Cytotec IOL  See admission orders

## 2019-10-08 NOTE — OB PROVIDER H&P - HISTORY OF PRESENT ILLNESS
30 y/o 39.2 weeks  with complaints of LOF clear and odorless since 0530 am. pt reports increase pelvic pressure. pt denies bleeding. pt denies n/v/d, fever or chills. pt reports good fetal movement at this time.  AP uncomplicated at this time.   Fetal alert for fluid around kidneys

## 2019-10-08 NOTE — OB PROVIDER H&P - ASSESSMENT
28 y/o 39.2 weeks  with complaints of LOF clear and odorless since 0530 am. pt reports increase pelvic pressure. pt denies bleeding. pt denies n/v/d, fever or chills. pt reports good fetal movement at this time.  AP uncomplicated at this time.   Fetal alert for fluid around kidneys    NKDA  med/surg hx denies  GYN hx denies  OB hx denies  Meds:   PNV    Abdomen soft, non tender  SVE: /-3  +pooling  TAS: Cephalic presentation   EFW 3345 by Leopold's     CAT 1 tracing 2019  TOCO contractions 3-4 minutes     GBS negative     d/w Dr Ruiz  Admit l&d for PROM @ 39.2 weeks for PO Cytotec IOL  See admission orders 30 y/o 39.2 weeks  with complaints of LOF clear and odorless since 0530 am. pt reports increase pelvic pressure. pt denies bleeding. pt denies n/v/d, fever or chills. pt reports good fetal movement at this time.  AP uncomplicated at this time.   Fetal alert for fluid around kidneys    NKDA  med/surg hx denies  GYN hx denies  OB hx denies  Meds:   PNV    Abdomen soft, non tender  SVE: 50/-3  +pooling  TAS: Cephalic presentation   EFW 3345 by Leopold's     CAT 1 tracing 2019  TOCO contractions 3-4 minutes     GBS negative 2019    d/w Dr Ruiz  Admit l&d for PROM @ 39.2 weeks for PO Cytotec IOL  See admission orders

## 2019-10-08 NOTE — OB PROVIDER TRIAGE NOTE - NS_OBGYNHISTORY_OBGYN_ALL_OB_FT
Progress Notes by Melida Arias RN at 12/01/17 11:50 AM     Author:  Melida Arias RN Service:  (none) Author Type:  Registered Nurse     Filed:  12/01/17 11:50 AM Encounter Date:  12/1/2017 Status:  Signed     :  Melida Arias RN (Registered Nurse)              We have recommended to patient/parent/guardian that they should wait 15 minutes after receiving an injection.     Electronically Signed by:    Melida Arias RN , 12/1/2017[RM1.1T]      Revision History        User Key Date/Time User Provider Type Action    > RM1.1 12/01/17 11:50 AM Melida Arias RN Registered Nurse Sign    T - Template             OB/GYn hx denies

## 2019-10-08 NOTE — CHART NOTE - NSCHARTNOTEFT_GEN_A_CORE
Patient seen at bedside.   Tracing category 1   [place]: 170 MVU   To start pitocin due to inadequate MVUs   Discussed with patient and patient verbalized understanding   Will continue to monitor Tracing

## 2019-10-08 NOTE — CHART NOTE - NSCHARTNOTEFT_GEN_A_CORE
Patient seen at bedside. Patient reports increased pressure   Tracing category 1   ToCo 3-4/10   PE: 4/80/-3   Patient with oxygen, FSE and amnioinfusion.   Discussed with patient possible fetal intolerance of labor and need for c section if decels reoccur   Will continue to monitor

## 2019-10-08 NOTE — CHART NOTE - NSCHARTNOTEFT_GEN_A_CORE
R1 Labor Note    S: Patient evaluated at bedside for cervical change.     O:  T(C): 36.8 (10-08-19 @ 21:29), Max: 37.7 (10-08-19 @ 16:45)  HR: 121 (10-08-19 @ 23:16) (95 - 153)  BP: 134/79 (10-08-19 @ 23:14) (109/63 - 180/88)  RR: 16 (10-08-19 @ 09:49) (16 - 16)  SpO2: 100% (10-08-19 @ 23:21) (97% - 100%)    SVE: 5/90/-1  EFM: 145bpm, mod prieto, +accels, -decel  [place]:  cx q2min      A/P 29y P0 @39.2wks admitted for IOL for gHTN and PROM.  Possibility of C/S due to arrest already discussed with patient. Will attempt to restart pitocin again.   -IOL: to restart pitocin 1:1; s/p PO  -Cat 1 tracing - ISE and IUPC in place  -GBS neg  -EFW 3345g    d/w Dr. Mercer Robyn Frankel PGY1.

## 2019-10-08 NOTE — OB PROVIDER TRIAGE NOTE - HISTORY OF PRESENT ILLNESS
30 y/o 39.2 weeks  with complaints of LOF clear and odorless since 0530 am. pt denies bleeding and contractions. pt denies n/v/d, fever or chills. pt reports good fetal movement at this time.  AP uncomplicated at this time.   Fetal alert for fluid around kidneys 28 y/o 39.2 weeks  with complaints of LOF clear and odorless since 0530 am. pt reports increase pelvic pressure. pt denies bleeding. pt denies n/v/d, fever or chills. pt reports good fetal movement at this time.  AP uncomplicated at this time.   Fetal alert for fluid around kidneys

## 2019-10-08 NOTE — CHART NOTE - NSCHARTNOTEFT_GEN_A_CORE
NP note    Called to room to evaluate pt for 4 min deceleration with moderate variability and tetanic ctx.   Tracing improved sp repositioning, O2, IVF.   Arrived with Dr. Ruiz at bedside  SVE 3/80/-3 ISE applied, pt tolerated well.   Ctx q2min  For amnioinfusion  Consider terbutaline if pt becomes tachysystole    tosha,NP NP note    Called to room to evaluate pt for 4 min deceleration with moderate variability and tetanic ctx.   Tracing improved sp repositioning, O2, IVF.   Arrived with Dr. Ruiz at bedside  Labile BPs noted atleast 4 hours apart now meeting criteria for gHTN with PCR 0.29 and normal hellp labs, pt asymptomatic  SVE 3/80/-3 ISE applied, pt tolerated well.   Ctx q2min  For amnioinfusion  Consider terbutaline if pt becomes tachysystole    tosha,NP

## 2019-10-08 NOTE — OB PROVIDER H&P - NSHPPHYSICALEXAM_GEN_ALL_CORE
Vital Signs Last 24 Hrs  T(C): 37 (08 Oct 2019 07:44), Max: 37 (08 Oct 2019 07:44)  T(F): 98.6 (08 Oct 2019 07:44), Max: 98.6 (08 Oct 2019 07:44)  HR: 105 (08 Oct 2019 08:21) (105 - 109)  BP: 152/98 (08 Oct 2019 08:21) (140/100 - 152/98)  BP(mean): --  RR: 16 (08 Oct 2019 07:44) (16 - 16)  SpO2: --    Abdomen soft, non tender  SVE: 1/50/-3  +pooling   TAS: cephalic presentation

## 2019-10-09 ENCOUNTER — TRANSCRIPTION ENCOUNTER (OUTPATIENT)
Age: 29
End: 2019-10-09

## 2019-10-09 RX ORDER — OXYTOCIN 10 UNIT/ML
333.33 VIAL (ML) INJECTION
Qty: 20 | Refills: 0 | Status: DISCONTINUED | OUTPATIENT
Start: 2019-10-09 | End: 2019-10-09

## 2019-10-09 RX ORDER — OXYCODONE HYDROCHLORIDE 5 MG/1
5 TABLET ORAL ONCE
Refills: 0 | Status: DISCONTINUED | OUTPATIENT
Start: 2019-10-09 | End: 2019-10-11

## 2019-10-09 RX ORDER — DIBUCAINE 1 %
1 OINTMENT (GRAM) RECTAL EVERY 6 HOURS
Refills: 0 | Status: DISCONTINUED | OUTPATIENT
Start: 2019-10-09 | End: 2019-10-11

## 2019-10-09 RX ORDER — TETANUS TOXOID, REDUCED DIPHTHERIA TOXOID AND ACELLULAR PERTUSSIS VACCINE, ADSORBED 5; 2.5; 8; 8; 2.5 [IU]/.5ML; [IU]/.5ML; UG/.5ML; UG/.5ML; UG/.5ML
0.5 SUSPENSION INTRAMUSCULAR ONCE
Refills: 0 | Status: DISCONTINUED | OUTPATIENT
Start: 2019-10-09 | End: 2019-10-11

## 2019-10-09 RX ORDER — SIMETHICONE 80 MG/1
80 TABLET, CHEWABLE ORAL EVERY 4 HOURS
Refills: 0 | Status: DISCONTINUED | OUTPATIENT
Start: 2019-10-09 | End: 2019-10-11

## 2019-10-09 RX ORDER — GLYCERIN ADULT
1 SUPPOSITORY, RECTAL RECTAL AT BEDTIME
Refills: 0 | Status: DISCONTINUED | OUTPATIENT
Start: 2019-10-09 | End: 2019-10-11

## 2019-10-09 RX ORDER — SODIUM CHLORIDE 9 MG/ML
1000 INJECTION, SOLUTION INTRAVENOUS ONCE
Refills: 0 | Status: DISCONTINUED | OUTPATIENT
Start: 2019-10-09 | End: 2019-10-11

## 2019-10-09 RX ORDER — IBUPROFEN 200 MG
600 TABLET ORAL EVERY 6 HOURS
Refills: 0 | Status: DISCONTINUED | OUTPATIENT
Start: 2019-10-09 | End: 2019-10-11

## 2019-10-09 RX ORDER — IBUPROFEN 200 MG
600 TABLET ORAL EVERY 6 HOURS
Refills: 0 | Status: COMPLETED | OUTPATIENT
Start: 2019-10-09 | End: 2020-09-06

## 2019-10-09 RX ORDER — DOCUSATE SODIUM 100 MG
100 CAPSULE ORAL
Refills: 0 | Status: DISCONTINUED | OUTPATIENT
Start: 2019-10-09 | End: 2019-10-11

## 2019-10-09 RX ORDER — SODIUM CHLORIDE 9 MG/ML
3 INJECTION INTRAMUSCULAR; INTRAVENOUS; SUBCUTANEOUS EVERY 8 HOURS
Refills: 0 | Status: DISCONTINUED | OUTPATIENT
Start: 2019-10-09 | End: 2019-10-11

## 2019-10-09 RX ORDER — KETOROLAC TROMETHAMINE 30 MG/ML
30 SYRINGE (ML) INJECTION ONCE
Refills: 0 | Status: DISCONTINUED | OUTPATIENT
Start: 2019-10-09 | End: 2019-10-09

## 2019-10-09 RX ORDER — BENZOCAINE 10 %
1 GEL (GRAM) MUCOUS MEMBRANE EVERY 6 HOURS
Refills: 0 | Status: DISCONTINUED | OUTPATIENT
Start: 2019-10-09 | End: 2019-10-11

## 2019-10-09 RX ORDER — DIPHENHYDRAMINE HCL 50 MG
25 CAPSULE ORAL EVERY 6 HOURS
Refills: 0 | Status: DISCONTINUED | OUTPATIENT
Start: 2019-10-09 | End: 2019-10-11

## 2019-10-09 RX ORDER — METOCLOPRAMIDE HCL 10 MG
10 TABLET ORAL ONCE
Refills: 0 | Status: COMPLETED | OUTPATIENT
Start: 2019-10-09 | End: 2019-10-09

## 2019-10-09 RX ORDER — OXYCODONE HYDROCHLORIDE 5 MG/1
5 TABLET ORAL
Refills: 0 | Status: DISCONTINUED | OUTPATIENT
Start: 2019-10-09 | End: 2019-10-11

## 2019-10-09 RX ORDER — ACETAMINOPHEN 500 MG
975 TABLET ORAL
Refills: 0 | Status: DISCONTINUED | OUTPATIENT
Start: 2019-10-09 | End: 2019-10-11

## 2019-10-09 RX ORDER — AER TRAVELER 0.5 G/1
1 SOLUTION RECTAL; TOPICAL EVERY 4 HOURS
Refills: 0 | Status: DISCONTINUED | OUTPATIENT
Start: 2019-10-09 | End: 2019-10-11

## 2019-10-09 RX ORDER — MAGNESIUM HYDROXIDE 400 MG/1
30 TABLET, CHEWABLE ORAL
Refills: 0 | Status: DISCONTINUED | OUTPATIENT
Start: 2019-10-09 | End: 2019-10-11

## 2019-10-09 RX ORDER — SODIUM CHLORIDE 9 MG/ML
1000 INJECTION, SOLUTION INTRAVENOUS
Refills: 0 | Status: DISCONTINUED | OUTPATIENT
Start: 2019-10-09 | End: 2019-10-10

## 2019-10-09 RX ORDER — CITRIC ACID/SODIUM CITRATE 300-500 MG
30 SOLUTION, ORAL ORAL ONCE
Refills: 0 | Status: COMPLETED | OUTPATIENT
Start: 2019-10-09 | End: 2019-10-09

## 2019-10-09 RX ORDER — HYDROCORTISONE 1 %
1 OINTMENT (GRAM) TOPICAL EVERY 6 HOURS
Refills: 0 | Status: DISCONTINUED | OUTPATIENT
Start: 2019-10-09 | End: 2019-10-11

## 2019-10-09 RX ORDER — PRAMOXINE HYDROCHLORIDE 150 MG/15G
1 AEROSOL, FOAM RECTAL EVERY 4 HOURS
Refills: 0 | Status: DISCONTINUED | OUTPATIENT
Start: 2019-10-09 | End: 2019-10-11

## 2019-10-09 RX ORDER — LANOLIN
1 OINTMENT (GRAM) TOPICAL EVERY 6 HOURS
Refills: 0 | Status: DISCONTINUED | OUTPATIENT
Start: 2019-10-09 | End: 2019-10-11

## 2019-10-09 RX ORDER — FAMOTIDINE 10 MG/ML
20 INJECTION INTRAVENOUS ONCE
Refills: 0 | Status: COMPLETED | OUTPATIENT
Start: 2019-10-09 | End: 2019-10-09

## 2019-10-09 RX ADMIN — Medication 10 MILLIGRAM(S): at 05:40

## 2019-10-09 RX ADMIN — Medication 1000 MILLIUNIT(S)/MIN: at 05:40

## 2019-10-09 RX ADMIN — FAMOTIDINE 20 MILLIGRAM(S): 10 INJECTION INTRAVENOUS at 05:40

## 2019-10-09 RX ADMIN — SODIUM CHLORIDE 3 MILLILITER(S): 9 INJECTION INTRAMUSCULAR; INTRAVENOUS; SUBCUTANEOUS at 21:26

## 2019-10-09 RX ADMIN — Medication 30 MILLIGRAM(S): at 06:48

## 2019-10-09 RX ADMIN — Medication 975 MILLIGRAM(S): at 21:25

## 2019-10-09 RX ADMIN — SODIUM CHLORIDE 3 MILLILITER(S): 9 INJECTION INTRAMUSCULAR; INTRAVENOUS; SUBCUTANEOUS at 13:31

## 2019-10-09 RX ADMIN — Medication 30 MILLIGRAM(S): at 07:18

## 2019-10-09 RX ADMIN — Medication 975 MILLIGRAM(S): at 17:02

## 2019-10-09 RX ADMIN — Medication 975 MILLIGRAM(S): at 16:15

## 2019-10-09 RX ADMIN — Medication 600 MILLIGRAM(S): at 18:19

## 2019-10-09 RX ADMIN — Medication 600 MILLIGRAM(S): at 12:38

## 2019-10-09 RX ADMIN — Medication 30 MILLILITER(S): at 05:40

## 2019-10-09 RX ADMIN — Medication 600 MILLIGRAM(S): at 18:55

## 2019-10-09 RX ADMIN — Medication 0.25 MILLIGRAM(S): at 03:04

## 2019-10-09 RX ADMIN — Medication 600 MILLIGRAM(S): at 13:31

## 2019-10-09 RX ADMIN — Medication 975 MILLIGRAM(S): at 22:25

## 2019-10-09 NOTE — CHART NOTE - NSCHARTNOTEFT_GEN_A_CORE
R2 Labor Note    went to assess patient for cervical change    T(C): 36.7 (10-08-19 @ 23:30), Max: 37.7 (10-08-19 @ 16:45)  HR: 138 (10-09-19 @ 01:31) (95 - 153)  BP: 143/78 (10-09-19 @ 01:29) (109/63 - 180/88)  RR: 16 (10-08-19 @ 09:49) (16 - 16)  SpO2: 100% (10-09-19 @ 01:31) (97% - 100%)    FHT: category 1  toco: q1-2mins  VE: 7/90/-1    - peanut ball  - top off  - pit at 1 unit, continue for now    MAYLIN Overton PGY2  d/w Dr. Ruiz R2 Labor Note    went to assess patient for cervical change    T(C): 36.7 (10-08-19 @ 23:30), Max: 37.7 (10-08-19 @ 16:45)  HR: 138 (10-09-19 @ 01:31) (95 - 153)  BP: 143/78 (10-09-19 @ 01:29) (109/63 - 180/88)  RR: 16 (10-08-19 @ 09:49) (16 - 16)  SpO2: 100% (10-09-19 @ 01:31) (97% - 100%)    FHT: category 1  toco: q1-2mins  VE: 7/90/-1    - peanut ball  - top off  - pit at 1 unit, will stop given ctx pattern    MAYLIN Overton PGY2  d/w Dr. Ruiz

## 2019-10-09 NOTE — DISCHARGE NOTE OB - MATERIALS PROVIDED
Breastfeeding Guide and Packet/[facility]  Screening Program/Guide to Postpartum Care/MMR Vaccination (VIS Pub Date: 2012)/Tdap Vaccination (VIS Pub Date: 2012)/Shaken Baby Prevention Handout/Birth Certificate Instructions/Vaccinations

## 2019-10-09 NOTE — PROVIDER CONTACT NOTE (OTHER) - ASSESSMENT
Pt reports perineal pain 7/10. Fundus firm and at umbilicus, lochia moderate, voiding and ambulating. Denies dizziness, SOB, lightheadedness, palpitations.

## 2019-10-09 NOTE — PROVIDER CONTACT NOTE (OTHER) - ASSESSMENT
EBL 400cc. Pt ambulating, voided x1 as per L&D RN with no c/o palpitations, dizziness, SOB. Fundus firm, at umbilicus, lochia moderate.

## 2019-10-09 NOTE — OB RN DELIVERY SUMMARY - NS_LABORCHARACTER_OBGYN_ALL_OB
Augmentation of labor/External electronic FM/Fetal intolerance/Induction of labor-Medicinal/Internal electronic FM/Meconium staining

## 2019-10-09 NOTE — OB PROVIDER DELIVERY SUMMARY - NSPROVIDERDELIVERYNOTE_OBGYN_ALL_OB_FT
VAVD for cat II FHT, male, APGARS 7, 8, nuchal cord x1 reduced   RML episiotomy repaired with vicryl suture, EBL 400cc VAVD for cat II FHT, male, APGARS 7, 8, nuchal cord x1 reduced, weight 8lb 2oz   RML episiotomy repaired with vicryl suture, EBL 400cc

## 2019-10-09 NOTE — DISCHARGE NOTE OB - MEDICATION SUMMARY - MEDICATIONS TO TAKE
I will START or STAY ON the medications listed below when I get home from the hospital:    acetaminophen 325 mg oral tablet  -- 3 tab(s) by mouth   -- Indication: For pain, as needed    ibuprofen 600 mg oral tablet  -- 1 tab(s) by mouth every 6 hours  -- Indication: For pain, as needed

## 2019-10-09 NOTE — OB RN DELIVERY SUMMARY - AS DELIV COMPLICATIONS OB
meconium stained fluid/abnormal fetal heart rate tracing prolonged rupture of membranes/abnormal fetal heart rate tracing/meconium stained fluid

## 2019-10-09 NOTE — DISCHARGE NOTE OB - CARE PLAN
Principal Discharge DX:	Vacuum-assisted vaginal delivery  Goal:	recovery  Assessment and plan of treatment:	routine care

## 2019-10-09 NOTE — DISCHARGE NOTE OB - CARE PROVIDER_API CALL
Piyush Abdi)  Gynecology Obstetrics  Gynecology  [address] [place] [address]  Phone: [phone]  Fax: [phone]  Follow Up Time:

## 2019-10-09 NOTE — PROVIDER CONTACT NOTE (OTHER) - SITUATION
GEOFF 10/9@ 05:16, transfer report received from L&D RN stating pt admitted with  and continues to remain in low 100's post delivery. Elevation in HR as expected during course of labor.

## 2019-10-09 NOTE — DISCHARGE NOTE OB - PATIENT PORTAL LINK FT
You can access the FollowMyHealth Patient Portal offered by [facility] by registering at the following website: [email or web address]. By joining [employer]’s FollowMyHealth portal, you will also be able to view your health information using other applications (apps) compatible with our system.

## 2019-10-09 NOTE — CHART NOTE - NSCHARTNOTEFT_GEN_A_CORE
R1 Labor Note    S: Patient evaluated at bedside for cervical change.     O:  T(C): 37.0 (10-09-19 @ 02:41), Max: 37.7 (10-08-19 @ 16:45)  HR: 130 (10-09-19 @ 02:41) (95 - 165)  BP: 142/86 (10-09-19 @ 02:29) (109/63 - 180/88)  RR: 16 (10-08-19 @ 09:49) (16 - 16)  SpO2: 100% (10-09-19 @ 02:41) (97% - 100%)    SVE: 9.5/100/0  EFM: 140 bpm, mod prieto, +accels, -decels  [place]:  cx q1-2min      A/P 29y P0 @39.2wks admitted for IOL for gHTN and PROM; pt now fully dilated and almost ready to start pushing.    -IOL: s/p Pitocin; s/p PO  -Cat 1 tracing - ISE and IUPC in place  -GBS neg  -EFW 3345g    d/w Dr. Mercer Robyn Frankel PGY1

## 2019-10-09 NOTE — OB RN DELIVERY SUMMARY - NS_SEPSISRSKCALC_OBGYN_ALL_OB_FT
EOS calculated successfully. EOS Risk Factor: 0.5/1000 live births ([facility] national incidence); GA=39w3d; Temp=99.86; ROM=23.433; GBS='Negative'; Antibiotics='No antibiotics or any antibiotics < 2 hrs prior to birth'

## 2019-10-10 RX ORDER — IBUPROFEN 200 MG
1 TABLET ORAL
Qty: 0 | Refills: 0 | DISCHARGE
Start: 2019-10-10

## 2019-10-10 RX ORDER — ACETAMINOPHEN 500 MG
3 TABLET ORAL
Qty: 0 | Refills: 0 | DISCHARGE
Start: 2019-10-10

## 2019-10-10 RX ADMIN — SODIUM CHLORIDE 3 MILLILITER(S): 9 INJECTION INTRAMUSCULAR; INTRAVENOUS; SUBCUTANEOUS at 06:57

## 2019-10-10 RX ADMIN — Medication 975 MILLIGRAM(S): at 09:23

## 2019-10-10 RX ADMIN — Medication 600 MILLIGRAM(S): at 06:00

## 2019-10-10 RX ADMIN — Medication 600 MILLIGRAM(S): at 15:15

## 2019-10-10 RX ADMIN — Medication 1 TABLET(S): at 12:42

## 2019-10-10 RX ADMIN — Medication 600 MILLIGRAM(S): at 05:35

## 2019-10-10 RX ADMIN — Medication 600 MILLIGRAM(S): at 22:58

## 2019-10-10 RX ADMIN — Medication 600 MILLIGRAM(S): at 00:54

## 2019-10-10 RX ADMIN — Medication 600 MILLIGRAM(S): at 17:00

## 2019-10-10 RX ADMIN — Medication 600 MILLIGRAM(S): at 12:42

## 2019-10-10 RX ADMIN — Medication 100 MILLIGRAM(S): at 05:35

## 2019-10-10 RX ADMIN — Medication 600 MILLIGRAM(S): at 01:28

## 2019-10-10 RX ADMIN — Medication 600 MILLIGRAM(S): at 23:30

## 2019-10-10 RX ADMIN — Medication 600 MILLIGRAM(S): at 18:31

## 2019-10-10 RX ADMIN — Medication 975 MILLIGRAM(S): at 10:00

## 2019-10-10 NOTE — PROGRESS NOTE ADULT - SUBJECTIVE AND OBJECTIVE BOX
POD#1 S/P Vaginal Delivery with epidural anesthesia    Patient is doing well.  OOBAA. Tolerating clears.  Pain is tolerable.  No residual anesthetic issues or complications noted.    Charito Marques CRNA

## 2019-10-10 NOTE — PROVIDER CONTACT NOTE (OTHER) - ACTION/TREATMENT ORDERED:
Pt had Orthrostatic BP done X 3. Pt is asymptomatic when pain is controlled BP is better Pt had Orthrostatic BP done X 3. Pt is asymptomatic when pain is controlled BP is better 0500 10/10 bp is 136/76 Pt had Orthrostatic BP done X 3. Pt is asymptomatic when pain is controlled BP is better 0500 10/10 bp is 136/79 will continue to monitor

## 2019-10-10 NOTE — PROGRESS NOTE ADULT - ASSESSMENT
PPD 1, labile BPs, likely ghtn, tachycardia  routine PP care  continue to monitor  precautions given

## 2019-10-10 NOTE — PROGRESS NOTE ADULT - SUBJECTIVE AND OBJECTIVE BOX
INTERVAL HPI/OVERNIGHT EVENTS: Pt seen and examined at bedside.  Doing well, denies complaints. +voiding without difficulty, +ambulation, jesús reg diet. denies heavy lochia     MEDICATIONS  (STANDING):  acetaminophen   Tablet .. 975 milliGRAM(s) Oral <User Schedule>  diphtheria/tetanus/pertussis (acellular) Vaccine (ADAcel) 0.5 milliLiter(s) IntraMuscular once  ibuprofen  Tablet. 600 milliGRAM(s) Oral every 6 hours  influenza   Vaccine 0.5 milliLiter(s) IntraMuscular once  lactated ringers Bolus 1000 milliLiter(s) IV Bolus once  lactated ringers. 1000 milliLiter(s) (125 mL/Hr) IV Continuous <Continuous>  prenatal multivitamin 1 Tablet(s) Oral daily  sodium chloride 0.9% lock flush 3 milliLiter(s) IV Push every 8 hours    MEDICATIONS  (PRN):  benzocaine 20%/menthol 0.5% Spray 1 Spray(s) Topical every 6 hours PRN for Perineal discomfort  dibucaine 1% Ointment 1 Application(s) Topical every 6 hours PRN Perineal discomfort  diphenhydrAMINE 25 milliGRAM(s) Oral every 6 hours PRN Pruritus  docusate sodium 100 milliGRAM(s) Oral two times a day PRN For stool softening  glycerin Suppository - Adult 1 Suppository(s) Rectal at bedtime PRN Constipation  hydrocortisone 1% Cream 1 Application(s) Topical every 6 hours PRN Moderate Pain (4-6)  lanolin Ointment 1 Application(s) Topical every 6 hours PRN nipple soreness  magnesium hydroxide Suspension 30 milliLiter(s) Oral two times a day PRN Constipation  oxyCODONE    IR 5 milliGRAM(s) Oral every 3 hours PRN Moderate to Severe Pain (4-10)  oxyCODONE    IR 5 milliGRAM(s) Oral once PRN Moderate to Severe Pain (4-10)  pramoxine 1%/zinc 5% Cream 1 Application(s) Topical every 4 hours PRN Moderate Pain (4-6)  simethicone 80 milliGRAM(s) Chew every 4 hours PRN Gas  witch hazel Pads 1 Application(s) Topical every 4 hours PRN Perineal discomfort      Vital Signs Last 24 Hrs  T(C): 36.7 (10 Oct 2019 05:40), Max: 36.9 (09 Oct 2019 21:05)  T(F): 98 (10 Oct 2019 05:40), Max: 98.4 (09 Oct 2019 21:05)  HR: 93 (10 Oct 2019 05:40) (93 - 110)  BP: 136/79 (10 Oct 2019 05:40) (136/79 - 144/67)  BP(mean): --  RR: 20 (10 Oct 2019 05:40) (16 - 20)  SpO2: 99% (10 Oct 2019 05:40) (98% - 100%)    PHYSICAL EXAM:    GA: NAD, A+0 x 3  chest CTA  regular rythm  Abd: soft, nontender, nondistended, no rebound or guarding,   Uterus: Fundus midline; firm  VE: nml lochia    LABS:

## 2019-10-10 NOTE — PROGRESS NOTE ADULT - SUBJECTIVE AND OBJECTIVE BOX
Patient assessed at 0832.  Subjective  Pain: Patient denies any pain at the time of assessment. Pain being managed well by pain management protocol  Complaints:  None. Patient denies any headache, blur vision, dizziness and/or heart palpitations. Patient reports elevated blood pressure upon admission and elevated heart rate during labor while pushing. Patient reports having blood pressure cuff at home if needed.   Milestones: Alert and orientedx3. Out of bed ambulating. Voiding. Tolerating a regular diet.  Infant feeding:      breastfeeding                           Feeding related issues or concerns: none    Objective   Vital Signs:  Vital Signs Last 24 Hrs  T(C): 36.7 (10 Oct 2019 05:40), Max: 36.9 (09 Oct 2019 21:05)  T(F): 98 (10 Oct 2019 05:40), Max: 98.4 (09 Oct 2019 21:05)  HR: 93 (10 Oct 2019 05:40) (93 - 110)  BP: 136/79 (10 Oct 2019 05:40) (136/79 - 144/67)  BP(mean): --  RR: 20 (10 Oct 2019 05:40) (16 - 20)  SpO2: 99% (10 Oct 2019 05:40) (98% - 100%)    Labs:      Blood Type: Opositive  Rubella: Immune  RPR: nonreactive    Medications  MEDICATIONS  (STANDING):  acetaminophen   Tablet .. 975 milliGRAM(s) Oral <User Schedule>  diphtheria/tetanus/pertussis (acellular) Vaccine (ADAcel) 0.5 milliLiter(s) IntraMuscular once  ibuprofen  Tablet. 600 milliGRAM(s) Oral every 6 hours  influenza   Vaccine 0.5 milliLiter(s) IntraMuscular once  lactated ringers Bolus 1000 milliLiter(s) IV Bolus once  lactated ringers. 1000 milliLiter(s) (125 mL/Hr) IV Continuous <Continuous>  prenatal multivitamin 1 Tablet(s) Oral daily  sodium chloride 0.9% lock flush 3 milliLiter(s) IV Push every 8 hours    MEDICATIONS  (PRN):  benzocaine 20%/menthol 0.5% Spray 1 Spray(s) Topical every 6 hours PRN for Perineal discomfort  dibucaine 1% Ointment 1 Application(s) Topical every 6 hours PRN Perineal discomfort  diphenhydrAMINE 25 milliGRAM(s) Oral every 6 hours PRN Pruritus  docusate sodium 100 milliGRAM(s) Oral two times a day PRN For stool softening  glycerin Suppository - Adult 1 Suppository(s) Rectal at bedtime PRN Constipation  hydrocortisone 1% Cream 1 Application(s) Topical every 6 hours PRN Moderate Pain (4-6)  lanolin Ointment 1 Application(s) Topical every 6 hours PRN nipple soreness  magnesium hydroxide Suspension 30 milliLiter(s) Oral two times a day PRN Constipation  oxyCODONE    IR 5 milliGRAM(s) Oral every 3 hours PRN Moderate to Severe Pain (4-10)  oxyCODONE    IR 5 milliGRAM(s) Oral once PRN Moderate to Severe Pain (4-10)  pramoxine 1%/zinc 5% Cream 1 Application(s) Topical every 4 hours PRN Moderate Pain (4-6)  simethicone 80 milliGRAM(s) Chew every 4 hours PRN Gas  witch hazel Pads 1 Application(s) Topical every 4 hours PRN Perineal discomfort    Assessment  30y/o . Day #1 @ 0550  postpartum .  Assisted delivery (vacuum, forceps): vacuum  Indication for assisted delivery: abnormal FHR  Past medical/surgical/OB/GYN history significant for L&D elevated heart rate during labor (150s-160s) resolved HR now 90s-100s  Current Issues: EBL 400cc   Lung sound clear bilaterally  Breasts: soft, nontender  Nipples: intact  Abdomen: Soft, nontender, nondistended. Fundus firm. Positive bowel sounds  Vagina: Lochia light rubra  Perineum:  slight edema no erythema noted  Laceration/episiotomy site: right mediolateral episiotomy   Lower extremities: Moderate edema noted bilaterally and equal of lower extremities. Nontender calves.  No erythema noted. Positive pedal pulses. No palpable veins noted.  Other relevant physical exam findings: none      Plan  Continue routine postpartum care.   Increase ambulation, analgesia PRN and pain medication protocol standing oxycodone, ibuprofen and acetaminophen.  Discussed signs/symptoms to report due this am blood pressure 136/79 asymptomatic and home blood pressure monitoring.   Discussed breast and nipple care for a breastfeeding mother.   Discussed pericare and sitz bath. Patient assessed at 0832.  Subjective  Pain: Patient denies any pain at the time of assessment. Pain being managed well by pain management protocol  Complaints:  None. Patient denies any headache, blur vision, dizziness and/or heart palpitations. Patient reports elevated blood pressure upon admission and elevated heart rate during labor while pushing. Patient reports having blood pressure cuff at home if needed.   Milestones: Alert and orientedx3. Out of bed ambulating. Voiding. Tolerating a regular diet.  Infant feeding:      breastfeeding                           Feeding related issues or concerns: none    Objective   Vital Signs:  Vital Signs Last 24 Hrs  T(C): 36.7 (10 Oct 2019 05:40), Max: 36.9 (09 Oct 2019 21:05)  T(F): 98 (10 Oct 2019 05:40), Max: 98.4 (09 Oct 2019 21:05)  HR: 93 (10 Oct 2019 05:40) (93 - 110)  BP: 136/79 (10 Oct 2019 05:40) (136/79 - 144/67)  BP(mean): --  RR: 20 (10 Oct 2019 05:40) (16 - 20)  SpO2: 99% (10 Oct 2019 05:40) (98% - 100%)    Labs:      Blood Type: Opositive  Rubella: Immune  RPR: nonreactive    Medications  MEDICATIONS  (STANDING):  acetaminophen   Tablet .. 975 milliGRAM(s) Oral <User Schedule>  diphtheria/tetanus/pertussis (acellular) Vaccine (ADAcel) 0.5 milliLiter(s) IntraMuscular once  ibuprofen  Tablet. 600 milliGRAM(s) Oral every 6 hours  influenza   Vaccine 0.5 milliLiter(s) IntraMuscular once  lactated ringers Bolus 1000 milliLiter(s) IV Bolus once  lactated ringers. 1000 milliLiter(s) (125 mL/Hr) IV Continuous <Continuous>  prenatal multivitamin 1 Tablet(s) Oral daily  sodium chloride 0.9% lock flush 3 milliLiter(s) IV Push every 8 hours    MEDICATIONS  (PRN):  benzocaine 20%/menthol 0.5% Spray 1 Spray(s) Topical every 6 hours PRN for Perineal discomfort  dibucaine 1% Ointment 1 Application(s) Topical every 6 hours PRN Perineal discomfort  diphenhydrAMINE 25 milliGRAM(s) Oral every 6 hours PRN Pruritus  docusate sodium 100 milliGRAM(s) Oral two times a day PRN For stool softening  glycerin Suppository - Adult 1 Suppository(s) Rectal at bedtime PRN Constipation  hydrocortisone 1% Cream 1 Application(s) Topical every 6 hours PRN Moderate Pain (4-6)  lanolin Ointment 1 Application(s) Topical every 6 hours PRN nipple soreness  magnesium hydroxide Suspension 30 milliLiter(s) Oral two times a day PRN Constipation  oxyCODONE    IR 5 milliGRAM(s) Oral every 3 hours PRN Moderate to Severe Pain (4-10)  oxyCODONE    IR 5 milliGRAM(s) Oral once PRN Moderate to Severe Pain (4-10)  pramoxine 1%/zinc 5% Cream 1 Application(s) Topical every 4 hours PRN Moderate Pain (4-6)  simethicone 80 milliGRAM(s) Chew every 4 hours PRN Gas  witch hazel Pads 1 Application(s) Topical every 4 hours PRN Perineal discomfort    Assessment  28y/o . Day #1 @ 0550  postpartum .  Assisted delivery (vacuum, forceps): vacuum  Indication for assisted delivery: abnormal FHR  Past medical/surgical/OB/GYN history significant for L&D elevated heart rate during labor (150s-160s) resolved HR now 90s-100s  Current Issues: EBL 400cc   Lung sound clear bilaterally. Apical heart rate 88  Breasts: soft, nontender  Nipples: intact  Abdomen: Soft, nontender, nondistended. Fundus firm. Positive bowel sounds  Vagina: Lochia light rubra  Perineum:  slight edema no erythema noted  Laceration/episiotomy site: right mediolateral episiotomy   Lower extremities: Moderate edema noted bilaterally and equal of lower extremities. Nontender calves.  No erythema noted. Positive pedal pulses. No palpable veins noted.  Other relevant physical exam findings: none      Plan  Continue routine postpartum care.   Increase ambulation, analgesia PRN and pain medication protocol standing oxycodone, ibuprofen and acetaminophen.  Discussed signs/symptoms to report due this am blood pressure 136/79 asymptomatic and home blood pressure monitoring.   Discussed breast and nipple care for a breastfeeding mother.   Discussed pericare and sitz bath.

## 2019-10-11 VITALS
DIASTOLIC BLOOD PRESSURE: 74 MMHG | OXYGEN SATURATION: 99 % | SYSTOLIC BLOOD PRESSURE: 125 MMHG | TEMPERATURE: 98 F | HEART RATE: 91 BPM | RESPIRATION RATE: 18 BRPM

## 2019-10-11 RX ADMIN — Medication 975 MILLIGRAM(S): at 09:30

## 2019-10-11 RX ADMIN — Medication 600 MILLIGRAM(S): at 12:52

## 2019-10-11 RX ADMIN — Medication 975 MILLIGRAM(S): at 01:24

## 2019-10-11 RX ADMIN — INFLUENZA VIRUS VACCINE 0.5 MILLILITER(S): 15; 15; 15; 15 SUSPENSION INTRAMUSCULAR at 06:00

## 2019-10-11 RX ADMIN — Medication 600 MILLIGRAM(S): at 06:01

## 2019-10-11 RX ADMIN — Medication 100 MILLIGRAM(S): at 04:20

## 2019-10-11 RX ADMIN — Medication 975 MILLIGRAM(S): at 08:46

## 2019-10-11 RX ADMIN — SIMETHICONE 80 MILLIGRAM(S): 80 TABLET, CHEWABLE ORAL at 04:20

## 2019-10-11 RX ADMIN — Medication 975 MILLIGRAM(S): at 02:00

## 2019-10-11 RX ADMIN — MAGNESIUM HYDROXIDE 30 MILLILITER(S): 400 TABLET, CHEWABLE ORAL at 04:20

## 2019-10-11 NOTE — PROGRESS NOTE ADULT - PROVIDER SPECIALTY LIST ADULT
Acute Care - Physical Therapy Discharge Summary  [facility]       Patient Name: Lilliana Castano  : 1922  MRN: [id number]    TR Deyanira Barroso PTA Physical Therapy Assistant    PB Mark Rivera, PT DPT Physical Therapist              PT Discharge Summary  Anticipated Discharge Disposition: skilled nursing facility  Reason for Discharge: Discharge from facility  Outcomes Achieved: Unable to make functional progress toward goals at this time  Discharge Destination: SNF      Deyanira Barroso PTA   7/21/2017

## 2019-10-11 NOTE — PROGRESS NOTE ADULT - SUBJECTIVE AND OBJECTIVE BOX
Subjective  Pain: well controlled  Complaints: none  Milestones: Alert and orientedx3. Out of bed ambulating. Voiding/Due to void. Tolerating a regular diet.  Infant feeding:  breast                               Feeding related issues or concerns:none    Objective   T(C): 36.4 (10-11-19 @ 04:50), Max: 36.9 (10-10-19 @ 17:53)  HR: 91 (10-11-19 @ 04:50) (91 - 98)  BP: 125/74 (10-11-19 @ 04:50) (120/86 - 140/84)  RR: 18 (10-11-19 @ 04:50) (18 - 18)  SpO2: 99% (10-11-19 @ 04:50) (99% - 100%)  Wt(kg): --      Assessment      30 y/o G1  P 1 .Day #2  postpartum.  Assisted delivery (vacuum, forceps): Vacuam  Indication for assisted delivery:  Past medical history significant forgHTN  Current Issues: none  Breasts:soft  Abdomen: Soft, nontender, nondistended.  Vagina: Lochia moderate  Perineum:  RML episiotomy  Laceration/episiotomy site: clean, no swelling  Lower extremities: 2+ edema noted bilaterally of lower extremities. Nontender calves.  Negative Tierney's sign. Positive pedal pulses.  Other relevant physical exam findings;none      Plan  Plan: Increase ambulation, analgesia PRN and pain medication protocal standing oxycodone, ibuprofen and acetaminophen.  Diet: Continue regular diet  Labs:nl  Continue routine postpartum care.   d/c home

## 2019-10-14 ENCOUNTER — APPOINTMENT (OUTPATIENT)
Dept: ANTEPARTUM | Facility: CLINIC | Age: 29
End: 2019-10-14

## 2019-10-21 NOTE — ED ADULT NURSE NOTE - NSFALLRSKHARMRISK_ED_ALL_ED
Requesting a referral to Sleep medicine  States he does not sleep at night and does snore when he does    Was supposed to get set up in the past for a sleep study but never followed through.      Patient would need a new referral   Referral prepped   no

## 2020-01-22 NOTE — OB PROVIDER TRIAGE NOTE - NS_CTXBYPALP_OBGYN_ALL_OB
None felt Continue Regimen: Clindamycin gel apply to face twice a day as spot treatment Detail Level: Zone Initiate Treatment: Differin gel apply at night Modify Regimen: Panoxyl wash 4% wash face daily

## 2020-03-09 NOTE — OB PROVIDER TRIAGE NOTE - ADDITIONAL INSTRUCTIONS
LM for pt to to call back   -Pt cleared for discharge home  -PTL precautions reviewed  -Pt to follow up with next scheduled appointment  -Verbal and written discharge instructions given

## 2020-08-03 ENCOUNTER — APPOINTMENT (OUTPATIENT)
Dept: ANTEPARTUM | Facility: CLINIC | Age: 30
End: 2020-08-03
Payer: COMMERCIAL

## 2020-08-03 ENCOUNTER — ASOB RESULT (OUTPATIENT)
Age: 30
End: 2020-08-03

## 2020-08-03 PROCEDURE — 76813 OB US NUCHAL MEAS 1 GEST: CPT | Mod: 59

## 2020-08-03 PROCEDURE — 36416 COLLJ CAPILLARY BLOOD SPEC: CPT

## 2020-08-03 PROCEDURE — 76801 OB US < 14 WKS SINGLE FETUS: CPT

## 2020-09-21 ENCOUNTER — ASOB RESULT (OUTPATIENT)
Age: 30
End: 2020-09-21

## 2020-09-21 ENCOUNTER — APPOINTMENT (OUTPATIENT)
Dept: ANTEPARTUM | Facility: CLINIC | Age: 30
End: 2020-09-21
Payer: COMMERCIAL

## 2020-09-21 PROCEDURE — 76811 OB US DETAILED SNGL FETUS: CPT

## 2021-01-27 ENCOUNTER — TRANSCRIPTION ENCOUNTER (OUTPATIENT)
Age: 31
End: 2021-01-27

## 2021-01-27 ENCOUNTER — OUTPATIENT (OUTPATIENT)
Dept: INPATIENT UNIT | Facility: HOSPITAL | Age: 31
LOS: 1 days | End: 2021-01-27

## 2021-01-27 ENCOUNTER — INPATIENT (INPATIENT)
Facility: HOSPITAL | Age: 31
LOS: 0 days | Discharge: HOME | End: 2021-01-28
Attending: OBSTETRICS & GYNECOLOGY | Admitting: OBSTETRICS & GYNECOLOGY

## 2021-01-27 VITALS
SYSTOLIC BLOOD PRESSURE: 139 MMHG | HEART RATE: 106 BPM | TEMPERATURE: 98 F | DIASTOLIC BLOOD PRESSURE: 80 MMHG | RESPIRATION RATE: 16 BRPM

## 2021-01-27 DIAGNOSIS — Z3A.00 WEEKS OF GESTATION OF PREGNANCY NOT SPECIFIED: ICD-10-CM

## 2021-01-27 DIAGNOSIS — O26.899 OTHER SPECIFIED PREGNANCY RELATED CONDITIONS, UNSPECIFIED TRIMESTER: ICD-10-CM

## 2021-01-27 LAB
ALBUMIN SERPL ELPH-MCNC: 3.3 G/DL — SIGNIFICANT CHANGE UP (ref 3.3–5)
ALP SERPL-CCNC: 103 U/L — SIGNIFICANT CHANGE UP (ref 40–120)
ALT FLD-CCNC: 9 U/L — SIGNIFICANT CHANGE UP (ref 4–33)
ANION GAP SERPL CALC-SCNC: 13 MMOL/L — SIGNIFICANT CHANGE UP (ref 7–14)
APPEARANCE UR: CLEAR — SIGNIFICANT CHANGE UP
APTT BLD: 31.7 SEC — SIGNIFICANT CHANGE UP (ref 27–36.3)
AST SERPL-CCNC: 14 U/L — SIGNIFICANT CHANGE UP (ref 4–32)
BASOPHILS # BLD AUTO: 0.02 K/UL — SIGNIFICANT CHANGE UP (ref 0–0.2)
BASOPHILS # BLD AUTO: 0.03 K/UL — SIGNIFICANT CHANGE UP (ref 0–0.2)
BASOPHILS NFR BLD AUTO: 0.2 % — SIGNIFICANT CHANGE UP (ref 0–2)
BASOPHILS NFR BLD AUTO: 0.3 % — SIGNIFICANT CHANGE UP (ref 0–2)
BILIRUB SERPL-MCNC: <0.2 MG/DL — SIGNIFICANT CHANGE UP (ref 0.2–1.2)
BILIRUB UR-MCNC: NEGATIVE — SIGNIFICANT CHANGE UP
BLD GP AB SCN SERPL QL: NEGATIVE — SIGNIFICANT CHANGE UP
BUN SERPL-MCNC: 7 MG/DL — SIGNIFICANT CHANGE UP (ref 7–23)
CALCIUM SERPL-MCNC: 8.9 MG/DL — SIGNIFICANT CHANGE UP (ref 8.4–10.5)
CHLORIDE SERPL-SCNC: 101 MMOL/L — SIGNIFICANT CHANGE UP (ref 98–107)
CO2 SERPL-SCNC: 22 MMOL/L — SIGNIFICANT CHANGE UP (ref 22–31)
COLOR SPEC: COLORLESS — SIGNIFICANT CHANGE UP
CREAT ?TM UR-MCNC: 29 MG/DL — SIGNIFICANT CHANGE UP
CREAT SERPL-MCNC: 0.61 MG/DL — SIGNIFICANT CHANGE UP (ref 0.5–1.3)
DIFF PNL FLD: NEGATIVE — SIGNIFICANT CHANGE UP
EOSINOPHIL # BLD AUTO: 0.03 K/UL — SIGNIFICANT CHANGE UP (ref 0–0.5)
EOSINOPHIL # BLD AUTO: 0.04 K/UL — SIGNIFICANT CHANGE UP (ref 0–0.5)
EOSINOPHIL NFR BLD AUTO: 0.2 % — SIGNIFICANT CHANGE UP (ref 0–6)
EOSINOPHIL NFR BLD AUTO: 0.4 % — SIGNIFICANT CHANGE UP (ref 0–6)
FIBRINOGEN PPP-MCNC: 837 MG/DL — HIGH (ref 290–520)
GLUCOSE SERPL-MCNC: 97 MG/DL — SIGNIFICANT CHANGE UP (ref 70–99)
GLUCOSE UR QL: NEGATIVE — SIGNIFICANT CHANGE UP
HCT VFR BLD CALC: 40.5 % — SIGNIFICANT CHANGE UP (ref 34.5–45)
HCT VFR BLD CALC: 41.4 % — SIGNIFICANT CHANGE UP (ref 34.5–45)
HGB BLD-MCNC: 13.5 G/DL — SIGNIFICANT CHANGE UP (ref 11.5–15.5)
HGB BLD-MCNC: 14.7 G/DL — SIGNIFICANT CHANGE UP (ref 11.5–15.5)
IANC: 7.34 K/UL — SIGNIFICANT CHANGE UP (ref 1.5–8.5)
IANC: 8.88 K/UL — HIGH (ref 1.5–8.5)
IMM GRANULOCYTES NFR BLD AUTO: 0.7 % — SIGNIFICANT CHANGE UP (ref 0–1.5)
IMM GRANULOCYTES NFR BLD AUTO: 0.7 % — SIGNIFICANT CHANGE UP (ref 0–1.5)
INR BLD: 1.05 RATIO — SIGNIFICANT CHANGE UP (ref 0.88–1.16)
KETONES UR-MCNC: NEGATIVE — SIGNIFICANT CHANGE UP
LDH SERPL L TO P-CCNC: 157 U/L — SIGNIFICANT CHANGE UP (ref 135–225)
LEUKOCYTE ESTERASE UR-ACNC: NEGATIVE — SIGNIFICANT CHANGE UP
LYMPHOCYTES # BLD AUTO: 19.3 % — SIGNIFICANT CHANGE UP (ref 13–44)
LYMPHOCYTES # BLD AUTO: 2.33 K/UL — SIGNIFICANT CHANGE UP (ref 1–3.3)
LYMPHOCYTES # BLD AUTO: 2.67 K/UL — SIGNIFICANT CHANGE UP (ref 1–3.3)
LYMPHOCYTES # BLD AUTO: 24.4 % — SIGNIFICANT CHANGE UP (ref 13–44)
MCHC RBC-ENTMCNC: 29.2 PG — SIGNIFICANT CHANGE UP (ref 27–34)
MCHC RBC-ENTMCNC: 30.8 PG — SIGNIFICANT CHANGE UP (ref 27–34)
MCHC RBC-ENTMCNC: 33.3 GM/DL — SIGNIFICANT CHANGE UP (ref 32–36)
MCHC RBC-ENTMCNC: 35.5 GM/DL — SIGNIFICANT CHANGE UP (ref 32–36)
MCV RBC AUTO: 86.6 FL — SIGNIFICANT CHANGE UP (ref 80–100)
MCV RBC AUTO: 87.7 FL — SIGNIFICANT CHANGE UP (ref 80–100)
MONOCYTES # BLD AUTO: 0.72 K/UL — SIGNIFICANT CHANGE UP (ref 0–0.9)
MONOCYTES # BLD AUTO: 0.8 K/UL — SIGNIFICANT CHANGE UP (ref 0–0.9)
MONOCYTES NFR BLD AUTO: 6 % — SIGNIFICANT CHANGE UP (ref 2–14)
MONOCYTES NFR BLD AUTO: 7.3 % — SIGNIFICANT CHANGE UP (ref 2–14)
NEUTROPHILS # BLD AUTO: 7.34 K/UL — SIGNIFICANT CHANGE UP (ref 1.8–7.4)
NEUTROPHILS # BLD AUTO: 8.88 K/UL — HIGH (ref 1.8–7.4)
NEUTROPHILS NFR BLD AUTO: 66.9 % — SIGNIFICANT CHANGE UP (ref 43–77)
NEUTROPHILS NFR BLD AUTO: 73.6 % — SIGNIFICANT CHANGE UP (ref 43–77)
NITRITE UR-MCNC: NEGATIVE — SIGNIFICANT CHANGE UP
NRBC # BLD: 0 /100 WBCS — SIGNIFICANT CHANGE UP
NRBC # BLD: 0 /100 WBCS — SIGNIFICANT CHANGE UP
NRBC # FLD: 0 K/UL — SIGNIFICANT CHANGE UP
NRBC # FLD: 0 K/UL — SIGNIFICANT CHANGE UP
PH UR: 7 — SIGNIFICANT CHANGE UP (ref 5–8)
PLATELET # BLD AUTO: 220 K/UL — SIGNIFICANT CHANGE UP (ref 150–400)
PLATELET # BLD AUTO: 242 K/UL — SIGNIFICANT CHANGE UP (ref 150–400)
POTASSIUM SERPL-MCNC: 3.7 MMOL/L — SIGNIFICANT CHANGE UP (ref 3.5–5.3)
POTASSIUM SERPL-SCNC: 3.7 MMOL/L — SIGNIFICANT CHANGE UP (ref 3.5–5.3)
PROT ?TM UR-MCNC: 6 MG/DL — SIGNIFICANT CHANGE UP
PROT ?TM UR-MCNC: 6 MG/DL — SIGNIFICANT CHANGE UP
PROT SERPL-MCNC: 6.5 G/DL — SIGNIFICANT CHANGE UP (ref 6–8.3)
PROT UR-MCNC: NEGATIVE — SIGNIFICANT CHANGE UP
PROT/CREAT UR-RTO: 0.2 RATIO — SIGNIFICANT CHANGE UP (ref 0–0.2)
PROTHROM AB SERPL-ACNC: 12.1 SEC — SIGNIFICANT CHANGE UP (ref 10.6–13.6)
RBC # BLD: 4.62 M/UL — SIGNIFICANT CHANGE UP (ref 3.8–5.2)
RBC # BLD: 4.78 M/UL — SIGNIFICANT CHANGE UP (ref 3.8–5.2)
RBC # FLD: 13.2 % — SIGNIFICANT CHANGE UP (ref 10.3–14.5)
RBC # FLD: 13.3 % — SIGNIFICANT CHANGE UP (ref 10.3–14.5)
RH IG SCN BLD-IMP: POSITIVE — SIGNIFICANT CHANGE UP
SARS-COV-2 IGG SERPL QL IA: NEGATIVE — SIGNIFICANT CHANGE UP
SARS-COV-2 IGM SERPL IA-ACNC: 0.5 INDEX — SIGNIFICANT CHANGE UP
SARS-COV-2 RNA SPEC QL NAA+PROBE: SIGNIFICANT CHANGE UP
SODIUM SERPL-SCNC: 136 MMOL/L — SIGNIFICANT CHANGE UP (ref 135–145)
SP GR SPEC: 1.01 — SIGNIFICANT CHANGE UP (ref 1.01–1.02)
T PALLIDUM AB TITR SER: NEGATIVE — SIGNIFICANT CHANGE UP
URATE SERPL-MCNC: 3.4 MG/DL — SIGNIFICANT CHANGE UP (ref 2.5–7)
UROBILINOGEN FLD QL: SIGNIFICANT CHANGE UP
WBC # BLD: 10.96 K/UL — HIGH (ref 3.8–10.5)
WBC # BLD: 12.06 K/UL — HIGH (ref 3.8–10.5)
WBC # FLD AUTO: 10.96 K/UL — HIGH (ref 3.8–10.5)
WBC # FLD AUTO: 12.06 K/UL — HIGH (ref 3.8–10.5)

## 2021-01-27 RX ORDER — DIPHENHYDRAMINE HCL 50 MG
25 CAPSULE ORAL EVERY 6 HOURS
Refills: 0 | Status: DISCONTINUED | OUTPATIENT
Start: 2021-01-27 | End: 2021-01-28

## 2021-01-27 RX ORDER — OXYTOCIN 10 UNIT/ML
333.33 VIAL (ML) INJECTION
Qty: 20 | Refills: 0 | Status: DISCONTINUED | OUTPATIENT
Start: 2021-01-27 | End: 2021-01-27

## 2021-01-27 RX ORDER — OXYCODONE HYDROCHLORIDE 5 MG/1
5 TABLET ORAL ONCE
Refills: 0 | Status: DISCONTINUED | OUTPATIENT
Start: 2021-01-27 | End: 2021-01-28

## 2021-01-27 RX ORDER — ACETAMINOPHEN 500 MG
3 TABLET ORAL
Qty: 0 | Refills: 0 | DISCHARGE
Start: 2021-01-27

## 2021-01-27 RX ORDER — HYDROCORTISONE 1 %
1 OINTMENT (GRAM) TOPICAL EVERY 6 HOURS
Refills: 0 | Status: DISCONTINUED | OUTPATIENT
Start: 2021-01-27 | End: 2021-01-28

## 2021-01-27 RX ORDER — ACETAMINOPHEN 500 MG
975 TABLET ORAL
Refills: 0 | Status: DISCONTINUED | OUTPATIENT
Start: 2021-01-27 | End: 2021-01-28

## 2021-01-27 RX ORDER — OXYCODONE HYDROCHLORIDE 5 MG/1
5 TABLET ORAL
Refills: 0 | Status: DISCONTINUED | OUTPATIENT
Start: 2021-01-27 | End: 2021-01-28

## 2021-01-27 RX ORDER — IBUPROFEN 200 MG
1 TABLET ORAL
Qty: 0 | Refills: 0 | DISCHARGE
Start: 2021-01-27

## 2021-01-27 RX ORDER — OXYTOCIN 10 UNIT/ML
2 VIAL (ML) INJECTION
Qty: 30 | Refills: 0 | Status: DISCONTINUED | OUTPATIENT
Start: 2021-01-27 | End: 2021-01-27

## 2021-01-27 RX ORDER — AMPICILLIN TRIHYDRATE 250 MG
2 CAPSULE ORAL ONCE
Refills: 0 | Status: COMPLETED | OUTPATIENT
Start: 2021-01-27 | End: 2021-01-27

## 2021-01-27 RX ORDER — DIBUCAINE 1 %
1 OINTMENT (GRAM) RECTAL EVERY 6 HOURS
Refills: 0 | Status: DISCONTINUED | OUTPATIENT
Start: 2021-01-27 | End: 2021-01-28

## 2021-01-27 RX ORDER — LANOLIN
1 OINTMENT (GRAM) TOPICAL EVERY 6 HOURS
Refills: 0 | Status: DISCONTINUED | OUTPATIENT
Start: 2021-01-27 | End: 2021-01-28

## 2021-01-27 RX ORDER — IBUPROFEN 200 MG
600 TABLET ORAL EVERY 6 HOURS
Refills: 0 | Status: DISCONTINUED | OUTPATIENT
Start: 2021-01-27 | End: 2021-01-28

## 2021-01-27 RX ORDER — SODIUM CHLORIDE 9 MG/ML
3 INJECTION INTRAMUSCULAR; INTRAVENOUS; SUBCUTANEOUS EVERY 8 HOURS
Refills: 0 | Status: DISCONTINUED | OUTPATIENT
Start: 2021-01-27 | End: 2021-01-28

## 2021-01-27 RX ORDER — SIMETHICONE 80 MG/1
80 TABLET, CHEWABLE ORAL EVERY 4 HOURS
Refills: 0 | Status: DISCONTINUED | OUTPATIENT
Start: 2021-01-27 | End: 2021-01-28

## 2021-01-27 RX ORDER — MAGNESIUM HYDROXIDE 400 MG/1
30 TABLET, CHEWABLE ORAL
Refills: 0 | Status: DISCONTINUED | OUTPATIENT
Start: 2021-01-27 | End: 2021-01-28

## 2021-01-27 RX ORDER — SODIUM CHLORIDE 9 MG/ML
1000 INJECTION, SOLUTION INTRAVENOUS ONCE
Refills: 0 | Status: COMPLETED | OUTPATIENT
Start: 2021-01-27 | End: 2021-01-27

## 2021-01-27 RX ORDER — IBUPROFEN 200 MG
600 TABLET ORAL EVERY 6 HOURS
Refills: 0 | Status: COMPLETED | OUTPATIENT
Start: 2021-01-27 | End: 2021-12-26

## 2021-01-27 RX ORDER — SODIUM CHLORIDE 9 MG/ML
1000 INJECTION, SOLUTION INTRAVENOUS
Refills: 0 | Status: DISCONTINUED | OUTPATIENT
Start: 2021-01-27 | End: 2021-01-27

## 2021-01-27 RX ORDER — PRAMOXINE HYDROCHLORIDE 150 MG/15G
1 AEROSOL, FOAM RECTAL EVERY 4 HOURS
Refills: 0 | Status: DISCONTINUED | OUTPATIENT
Start: 2021-01-27 | End: 2021-01-28

## 2021-01-27 RX ORDER — AER TRAVELER 0.5 G/1
1 SOLUTION RECTAL; TOPICAL EVERY 4 HOURS
Refills: 0 | Status: DISCONTINUED | OUTPATIENT
Start: 2021-01-27 | End: 2021-01-28

## 2021-01-27 RX ORDER — TETANUS TOXOID, REDUCED DIPHTHERIA TOXOID AND ACELLULAR PERTUSSIS VACCINE, ADSORBED 5; 2.5; 8; 8; 2.5 [IU]/.5ML; [IU]/.5ML; UG/.5ML; UG/.5ML; UG/.5ML
0.5 SUSPENSION INTRAMUSCULAR ONCE
Refills: 0 | Status: DISCONTINUED | OUTPATIENT
Start: 2021-01-27 | End: 2021-01-28

## 2021-01-27 RX ORDER — BENZOCAINE 10 %
1 GEL (GRAM) MUCOUS MEMBRANE EVERY 6 HOURS
Refills: 0 | Status: DISCONTINUED | OUTPATIENT
Start: 2021-01-27 | End: 2021-01-28

## 2021-01-27 RX ORDER — KETOROLAC TROMETHAMINE 30 MG/ML
30 SYRINGE (ML) INJECTION ONCE
Refills: 0 | Status: DISCONTINUED | OUTPATIENT
Start: 2021-01-27 | End: 2021-01-27

## 2021-01-27 RX ORDER — SODIUM CHLORIDE 9 MG/ML
500 INJECTION, SOLUTION INTRAVENOUS ONCE
Refills: 0 | Status: DISCONTINUED | OUTPATIENT
Start: 2021-01-27 | End: 2021-01-28

## 2021-01-27 RX ORDER — AMPICILLIN TRIHYDRATE 250 MG
1 CAPSULE ORAL EVERY 4 HOURS
Refills: 0 | Status: DISCONTINUED | OUTPATIENT
Start: 2021-01-27 | End: 2021-01-27

## 2021-01-27 RX ADMIN — Medication 216 GRAM(S): at 03:58

## 2021-01-27 RX ADMIN — Medication 1000 MILLIUNIT(S)/MIN: at 13:45

## 2021-01-27 RX ADMIN — Medication 975 MILLIGRAM(S): at 20:09

## 2021-01-27 RX ADMIN — SODIUM CHLORIDE 2000 MILLILITER(S): 9 INJECTION, SOLUTION INTRAVENOUS at 03:58

## 2021-01-27 RX ADMIN — SODIUM CHLORIDE 3 MILLILITER(S): 9 INJECTION INTRAMUSCULAR; INTRAVENOUS; SUBCUTANEOUS at 15:06

## 2021-01-27 RX ADMIN — SODIUM CHLORIDE 3 MILLILITER(S): 9 INJECTION INTRAMUSCULAR; INTRAVENOUS; SUBCUTANEOUS at 22:00

## 2021-01-27 RX ADMIN — SODIUM CHLORIDE 125 MILLILITER(S): 9 INJECTION, SOLUTION INTRAVENOUS at 03:58

## 2021-01-27 RX ADMIN — Medication 108 GRAM(S): at 07:51

## 2021-01-27 RX ADMIN — Medication 2 MILLIUNIT(S)/MIN: at 06:38

## 2021-01-27 NOTE — DISCHARGE NOTE OB - PATIENT PORTAL LINK FT
You can access the FollowMyHealth Patient Portal offered by [facility] by registering at the following website: [email or web address]. By joining [employer]’s FollowMyHealth portal, you will also be able to view your health information using other applications (apps) compatible with our system.

## 2021-01-27 NOTE — OB RN DELIVERY SUMMARY - NS_SEPSISRSKCALC_OBGYN_ALL_OB_FT
EOS calculated successfully. EOS Risk Factor: 0.5/1000 live births ([facility] national incidence); GA=38w;Temp=98.6; ROM=11.167; GBS='Positive'; Antibiotics='Broad spectrum antibiotics 2-3.9 hrs prior to birth'

## 2021-01-27 NOTE — DISCHARGE NOTE OB - MEDICATION SUMMARY - MEDICATIONS TO TAKE
I will START or STAY ON the medications listed below when I get home from the hospital:    acetaminophen 325 mg oral tablet  -- 3 tab(s) by mouth   -- Indication: For pain     ibuprofen 600 mg oral tablet  -- 1 tab(s) by mouth every 6 hours  -- Indication: For pain

## 2021-01-27 NOTE — DISCHARGE NOTE OB - CARE PROVIDER_API CALL
Piyush Herron)  Obstetrics and Gynecology Obstetrics and Gynocology  [address] [place] [address]  Phone: [phone]  Fax: [phone]  Follow Up Time:

## 2021-01-27 NOTE — OB RN PATIENT PROFILE - ALERT: PERTINENT HISTORY
Fetal Sonogram/1st Trimester Sonogram/20 Week Level II Sonogram/BioPhysical Profile(s)/Follow up Sonogram for Growth/Fetal Non-Stress Test (NST)

## 2021-01-27 NOTE — DISCHARGE NOTE OB - CARE PLAN
Principal Discharge DX:	Vaginal delivery  Goal:	recovery  Assessment and plan of treatment:	routine care

## 2021-01-27 NOTE — CHART NOTE - NSCHARTNOTEFT_GEN_A_CORE
Cat 1 fhr tracing, fhr 140 with mod variability, accels, early decels  contractions q2-3min    sve 5/90/-3    Vital Signs Last 24 Hrs  T(C): 36.6 (27 Jan 2021 04:01), Max: 36.9 (27 Jan 2021 02:22)  T(F): 97.88 (27 Jan 2021 04:01), Max: 98.4 (27 Jan 2021 02:22)  HR: 99 (27 Jan 2021 07:41) (90 - 123)  BP: 140/78 (27 Jan 2021 07:34) (109/62 - 159/97)  BP(mean): --  RR: 16 (27 Jan 2021 04:00) (16 - 16)  SpO2: 100% (27 Jan 2021 07:41) (97% - 100%)    Dr Gopal Capellan NP
Patient seen at bedside   Resting comfortably with epidural in place   tracing category 1   [place]: 2/10   VE: 3.5/60/-3   Pitocin to be started   Titrate pitocin
pt seen and examined for cat 2 fht   IUPC placed  VE: 5.5/90/-3  ctx pattern unclear- currently on 2 mu pitocin   fht cat 2- min- mod variability, recurrent late/early/variable decels   epidural in place for pain control   Plan to hold pitocin   resuscitative measures in place for cat 2
pt seen and examined for rectal pressure   VE: FD/100/-1   ctx q 1-2 min (not on pitocin)   fht cat 1   labile bps- will monitor, hellp labs wnl this am   anticipate

## 2021-01-27 NOTE — OB PROVIDER H&P - HISTORY OF PRESENT ILLNESS
Pt. is a 29y/o  EGA 38wks reports of leakage of fluid around 12:30am and contractions every 5mins. Pt. denies vaginal bleeding. Pt. reports good fetal movement    AP: Denies  Medical Hx: COVID Pos. 2020  Surgical Hx: Denies  OBGYN Hx:    10/9/2019 8#0  Meds:PNV  NKDA

## 2021-01-27 NOTE — OB PROVIDER TRIAGE NOTE - HISTORY OF PRESENT ILLNESS
Pt. is a 31y/o  EGA 38wks reports of leakage of fluid around 12:30am and contractions every 5mins. Pt. denies vaginal bleeding. Pt. reports good fetal movement    AP: Denies  Medical Hx: COVID Pos. 2020  Surgical Hx: Denies  OBGYN Hx:    10/9/2019 8#0  Meds:PNV  NKDA

## 2021-01-27 NOTE — OB PROVIDER H&P - NS_SONOPERFORMEDBY_OBGYN_ALL_OB_FT
HPI:  63M with PMHx DM, CAD s/p x3 stents (unknown when), ETOH abuse, and previous episodes of alcoholic pancreatitis w/ recent MICU admission requiring intubation 2/2 aspiration and GI stent placement on 9/4 for pyloric outlet obstruction presents with a two day history of abdominal pain. CT scan demonstrates stent passing distally into jejunum. The patient currently denies nausea / vomiting / fevers / chills / CP / dysuria. The patient is not clinically obstructed and continues to pass flatus / bowel movements.     PAST MEDICAL & SURGICAL HISTORY:  Pancreatitis: multiple episodes in the past  ETOH abuse  DM (diabetes mellitus)  S/P drug eluting coronary stent placement    MEDICATIONS  (STANDING):  ALBUTerol    0.083% 2.5 milliGRAM(s) Nebulizer every 6 hours  atorvastatin 40 milliGRAM(s) Oral at bedtime  cefepime   IVPB 2000 milliGRAM(s) IV Intermittent every 12 hours  cefepime   IVPB      chlorhexidine 2% Cloths 1 Application(s) Topical <User Schedule>  dextrose 5%. 1000 milliLiter(s) (50 mL/Hr) IV Continuous <Continuous>  dextrose 5%. 1000 milliLiter(s) (50 mL/Hr) IV Continuous <Continuous>  dextrose 50% Injectable 12.5 Gram(s) IV Push once  dextrose 50% Injectable 25 Gram(s) IV Push once  dextrose 50% Injectable 25 Gram(s) IV Push once  dextrose 50% Injectable 12.5 Gram(s) IV Push once  dextrose 50% Injectable 25 Gram(s) IV Push once  dextrose 50% Injectable 25 Gram(s) IV Push once  folic acid 1 milliGRAM(s) Oral daily  insulin lispro (HumaLOG) corrective regimen sliding scale   SubCutaneous Before meals and at bedtime  pantoprazole  Injectable 40 milliGRAM(s) IV Push daily  predniSONE   Tablet 60 milliGRAM(s) Oral daily  spironolactone 50 milliGRAM(s) Oral daily  thiamine 100 milliGRAM(s) Oral daily    MEDICATIONS  (PRN):  acetaminophen   Tablet .. 325 milliGRAM(s) Oral every 4 hours PRN Mild Pain (1 - 3)  dextrose 40% Gel 15 Gram(s) Oral once PRN Blood Glucose LESS THAN 70 milliGRAM(s)/deciliter  dextrose 40% Gel 15 Gram(s) Oral once PRN Blood Glucose LESS THAN 70 milliGRAM(s)/deciliter  glucagon  Injectable 1 milliGRAM(s) IntraMuscular once PRN Glucose LESS THAN 70 milligrams/deciliter  glucagon  Injectable 1 milliGRAM(s) IntraMuscular once PRN Glucose LESS THAN 70 milligrams/deciliter    Allergies  No Known Allergies    Vital Signs Last 24 Hrs  T(C): 36.6 (13 Sep 2018 08:36), Max: 36.9 (12 Sep 2018 16:04)  T(F): 97.9 (13 Sep 2018 08:36), Max: 98.5 (12 Sep 2018 21:02)  HR: 94 (13 Sep 2018 09:24) (91 - 109)  BP: 136/78 (13 Sep 2018 08:36) (119/82 - 137/87)  BP(mean): --  RR: 21 (13 Sep 2018 09:24) (18 - 21)  SpO2: 96% (13 Sep 2018 09:24) (96% - 100%)    PHYSICAL EXAM  Gen: NAD   HEENT: NCTA   Resp: On HFNC   CV: RRR  Abd: soft, nt / nd, no rebound or guarding, no peritoneal signs     LABS:                        7.3    4.6   )-----------( 53       ( 13 Sep 2018 07:05 )             21.9     09-13    132<L>  |  92<L>  |  7   ----------------------------<  85  3.7   |  29  |  0.29<L>    Ca    8.3<L>      13 Sep 2018 07:05  Phos  2.6     09-13  Mg     1.5     09-13    TPro  6.2  /  Alb  3.0<L>  /  TBili  0.7  /  DBili  0.2  /  AST  15  /  ALT  8<L>  /  AlkPhos  103  09-12          RADIOLOGY & ADDITIONAL STUDIES:  stent migrated distally into jejunum, no signs of perforation
(2) potential problem
BRIAN Arevalo, NP

## 2021-01-27 NOTE — OB PROVIDER H&P - ASSESSMENT
Evidence of rupture of membranes, discussed with Dr. Ruiz  -Admit to L&D  -Ampicillin for GBS  -For epidural  -Expectant management

## 2021-01-27 NOTE — OB PROVIDER TRIAGE NOTE - NSHPPHYSICALEXAM_GEN_ALL_CORE
BP: 139/80, HR: 106, Temp: 36.9  Abdomen soft nontender  SVE: 3.5/60/-3 grossly ruptured of clear fluid  TAS: Cephalic presentation  GBS: Pos. in urine (6/25/2020)  EFW: 3600gms by leopolds   FHR: 135bpm, moderate variability, accelerations, no decelerations  Jaspal every 2-3mins

## 2021-01-27 NOTE — DISCHARGE NOTE OB - MATERIALS PROVIDED
Immunization Record/Breastfeeding Log/Guide to Postpartum Care/Shaken Baby Prevention Handout/Birth Certificate Instructions

## 2021-01-27 NOTE — OB PROVIDER TRIAGE NOTE - NSOBPROVIDERNOTE_OBGYN_ALL_OB_FT
Evidence of rupture of membranes, discussed with Dr. Ruiz  -Admit to L&D  -For epidural  -Expectant management

## 2021-01-27 NOTE — OB PROVIDER DELIVERY SUMMARY - NSPROVIDERDELIVERYNOTE_OBGYN_ALL_OB_FT
, male, APGARS 9/9, nuchal cord x1 reduced, weight 7lb9oz   2nd degree perineal laceration repaired with chromic suture   ebl 300cc , male, APGARS 9/9, nuchal cord x1 reduced, weight 7lb9oz   2nd degree perineal laceration repaired with chromic suture, superficial periurethral laceration hemostatic and not requiring repair   ebl 300cc

## 2021-01-28 VITALS
TEMPERATURE: 98 F | OXYGEN SATURATION: 100 % | SYSTOLIC BLOOD PRESSURE: 142 MMHG | HEART RATE: 92 BPM | DIASTOLIC BLOOD PRESSURE: 75 MMHG | RESPIRATION RATE: 18 BRPM

## 2021-01-28 DIAGNOSIS — O13.3 GESTATIONAL [PREGNANCY-INDUCED] HYPERTENSION WITHOUT SIGNIFICANT PROTEINURIA, THIRD TRIMESTER: ICD-10-CM

## 2021-01-28 RX ADMIN — Medication 975 MILLIGRAM(S): at 02:55

## 2021-01-28 RX ADMIN — Medication 1 TABLET(S): at 11:44

## 2021-01-28 RX ADMIN — SODIUM CHLORIDE 3 MILLILITER(S): 9 INJECTION INTRAMUSCULAR; INTRAVENOUS; SUBCUTANEOUS at 07:00

## 2021-01-28 RX ADMIN — Medication 975 MILLIGRAM(S): at 09:12

## 2021-01-28 RX ADMIN — Medication 600 MILLIGRAM(S): at 11:43

## 2021-01-28 NOTE — PROGRESS NOTE ADULT - SUBJECTIVE AND OBJECTIVE BOX
NP Provider note:  Patient seen at bedside resting comfortably offers no current complaints.  Ambulating and voiding without difficulty.  Passing flatus and tolerating regular diet.  both breast/bottle feeding.  Denies HA, CP, SOB, N/V/D, dizziness, palpitations, worsening abdominal pain, worsening vaginal bleeding, or any other concerns.      Vital Signs Last 24 Hrs  T(C): 36.8 (2021 06:00), Max: 37.2 (2021 18:13)  T(F): 98.2 (2021 06:00), Max: 99 (2021 18:13)  HR: 88 (2021 06:00) (87 - 146)  BP: 124/64 (2021 06:00) (111/75 - 145/64)  BP(mean): --  RR: 18 (2021 06:00) (18 - 18)  SpO2: 99% (2021 06:00) (96% - 100%)    Physical Exam:     Gen: A&Ox 3, NAD  Chest: CTA B/L  Cardiac: S1,S2; RRR  Breast: Soft, nontender, nonengorged  Abdomen: +BS; Soft, nontender, ND; Fundus firm below umbilicus  Gyn: Min lochia  Ext: Nontender, DTRS 2+, no worsening edema                          13.5   12.06 )-----------( 220      ( 2021 05:46 )             40.5        A/P:  PPD#1 s/p       - Discharge home with instructions  - strict PEC prec d/w pt at length  - BP checks at home TID, call office if BP >140/90  - PT will RTO in 3 days for BP check  -Breastfeeding encouraged   -d/w dr Ruiz

## 2021-01-29 ENCOUNTER — NON-APPOINTMENT (OUTPATIENT)
Age: 31
End: 2021-01-29

## 2021-01-29 DIAGNOSIS — O42.10 PREMATURE RUPTURE OF MEMBRANES, ONSET OF LABOR MORE THAN 24 HOURS FOLLOWING RUPTURE, UNSPECIFIED WEEKS OF GESTATION: ICD-10-CM

## 2021-01-29 RX ORDER — IBUPROFEN 600 MG/1
600 TABLET, FILM COATED ORAL EVERY 6 HOURS
Refills: 0 | Status: ACTIVE | COMMUNITY
Start: 2021-01-29

## 2021-01-29 RX ORDER — ACETAMINOPHEN 325 MG/1
325 TABLET ORAL EVERY 6 HOURS
Refills: 0 | Status: ACTIVE | COMMUNITY
Start: 2021-01-29

## 2021-01-31 ENCOUNTER — NON-APPOINTMENT (OUTPATIENT)
Age: 31
End: 2021-01-31

## 2021-01-31 DIAGNOSIS — O14.90 UNSPECIFIED PRE-ECLAMPSIA, UNSPECIFIED TRIMESTER: ICD-10-CM

## 2021-01-31 DIAGNOSIS — Z03.79 ENCOUNTER FOR OTHER SUSPECTED MATERNAL AND FETAL CONDITIONS RULED OUT: ICD-10-CM

## 2021-01-31 LAB
ALBUMIN SERPL ELPH-MCNC: 3.8 G/DL — SIGNIFICANT CHANGE UP (ref 3.3–5)
ALP SERPL-CCNC: 92 U/L — SIGNIFICANT CHANGE UP (ref 40–120)
ALT FLD-CCNC: 18 U/L — SIGNIFICANT CHANGE UP (ref 4–33)
ANION GAP SERPL CALC-SCNC: 12 MMOL/L — SIGNIFICANT CHANGE UP (ref 7–14)
APPEARANCE UR: ABNORMAL
APTT BLD: 35.5 SEC — SIGNIFICANT CHANGE UP (ref 27–36.3)
AST SERPL-CCNC: 22 U/L — SIGNIFICANT CHANGE UP (ref 4–32)
BACTERIA # UR AUTO: NEGATIVE — SIGNIFICANT CHANGE UP
BASOPHILS # BLD AUTO: 0.03 K/UL — SIGNIFICANT CHANGE UP (ref 0–0.2)
BASOPHILS NFR BLD AUTO: 0.3 % — SIGNIFICANT CHANGE UP (ref 0–2)
BILIRUB SERPL-MCNC: <0.2 MG/DL — SIGNIFICANT CHANGE UP (ref 0.2–1.2)
BILIRUB UR-MCNC: NEGATIVE — SIGNIFICANT CHANGE UP
BUN SERPL-MCNC: 9 MG/DL — SIGNIFICANT CHANGE UP (ref 7–23)
CALCIUM SERPL-MCNC: 9.5 MG/DL — SIGNIFICANT CHANGE UP (ref 8.4–10.5)
CHLORIDE SERPL-SCNC: 102 MMOL/L — SIGNIFICANT CHANGE UP (ref 98–107)
CO2 SERPL-SCNC: 25 MMOL/L — SIGNIFICANT CHANGE UP (ref 22–31)
COLOR SPEC: COLORLESS — SIGNIFICANT CHANGE UP
COMMENT - URINE: SIGNIFICANT CHANGE UP
CREAT ?TM UR-MCNC: 11 MG/DL — SIGNIFICANT CHANGE UP
CREAT SERPL-MCNC: 0.59 MG/DL — SIGNIFICANT CHANGE UP (ref 0.5–1.3)
DIFF PNL FLD: ABNORMAL
EOSINOPHIL # BLD AUTO: 0.12 K/UL — SIGNIFICANT CHANGE UP (ref 0–0.5)
EOSINOPHIL NFR BLD AUTO: 1.1 % — SIGNIFICANT CHANGE UP (ref 0–6)
EPI CELLS # UR: 4 /HPF — SIGNIFICANT CHANGE UP (ref 0–5)
FIBRINOGEN PPP-MCNC: 790 MG/DL — HIGH (ref 290–520)
GLUCOSE SERPL-MCNC: 85 MG/DL — SIGNIFICANT CHANGE UP (ref 70–99)
GLUCOSE UR QL: NEGATIVE — SIGNIFICANT CHANGE UP
HCT VFR BLD CALC: 37.2 % — SIGNIFICANT CHANGE UP (ref 34.5–45)
HGB BLD-MCNC: 12.2 G/DL — SIGNIFICANT CHANGE UP (ref 11.5–15.5)
HYALINE CASTS # UR AUTO: SIGNIFICANT CHANGE UP /LPF (ref 0–7)
IANC: 7.93 K/UL — SIGNIFICANT CHANGE UP (ref 1.5–8.5)
IMM GRANULOCYTES NFR BLD AUTO: 0.8 % — SIGNIFICANT CHANGE UP (ref 0–1.5)
INR BLD: 1.07 RATIO — SIGNIFICANT CHANGE UP (ref 0.88–1.16)
KETONES UR-MCNC: NEGATIVE — SIGNIFICANT CHANGE UP
LDH SERPL L TO P-CCNC: 198 U/L — SIGNIFICANT CHANGE UP (ref 135–225)
LEUKOCYTE ESTERASE UR-ACNC: ABNORMAL
LYMPHOCYTES # BLD AUTO: 2.33 K/UL — SIGNIFICANT CHANGE UP (ref 1–3.3)
LYMPHOCYTES # BLD AUTO: 20.7 % — SIGNIFICANT CHANGE UP (ref 13–44)
MCHC RBC-ENTMCNC: 29.1 PG — SIGNIFICANT CHANGE UP (ref 27–34)
MCHC RBC-ENTMCNC: 32.8 GM/DL — SIGNIFICANT CHANGE UP (ref 32–36)
MCV RBC AUTO: 88.8 FL — SIGNIFICANT CHANGE UP (ref 80–100)
MONOCYTES # BLD AUTO: 0.74 K/UL — SIGNIFICANT CHANGE UP (ref 0–0.9)
MONOCYTES NFR BLD AUTO: 6.6 % — SIGNIFICANT CHANGE UP (ref 2–14)
NEUTROPHILS # BLD AUTO: 7.93 K/UL — HIGH (ref 1.8–7.4)
NEUTROPHILS NFR BLD AUTO: 70.5 % — SIGNIFICANT CHANGE UP (ref 43–77)
NITRITE UR-MCNC: NEGATIVE — SIGNIFICANT CHANGE UP
NRBC # BLD: 0 /100 WBCS — SIGNIFICANT CHANGE UP
NRBC # FLD: 0 K/UL — SIGNIFICANT CHANGE UP
PH UR: 7 — SIGNIFICANT CHANGE UP (ref 5–8)
PLATELET # BLD AUTO: 297 K/UL — SIGNIFICANT CHANGE UP (ref 150–400)
POTASSIUM SERPL-MCNC: 4 MMOL/L — SIGNIFICANT CHANGE UP (ref 3.5–5.3)
POTASSIUM SERPL-SCNC: 4 MMOL/L — SIGNIFICANT CHANGE UP (ref 3.5–5.3)
PROT ?TM UR-MCNC: 11 MG/DL — SIGNIFICANT CHANGE UP
PROT ?TM UR-MCNC: 11 MG/DL — SIGNIFICANT CHANGE UP
PROT SERPL-MCNC: 7.2 G/DL — SIGNIFICANT CHANGE UP (ref 6–8.3)
PROT UR-MCNC: ABNORMAL
PROT/CREAT UR-RTO: 1 RATIO — HIGH (ref 0–0.2)
PROTHROM AB SERPL-ACNC: 12.3 SEC — SIGNIFICANT CHANGE UP (ref 10.6–13.6)
RBC # BLD: 4.19 M/UL — SIGNIFICANT CHANGE UP (ref 3.8–5.2)
RBC # FLD: 13.2 % — SIGNIFICANT CHANGE UP (ref 10.3–14.5)
RBC CASTS # UR COMP ASSIST: 12 /HPF — HIGH (ref 0–4)
SODIUM SERPL-SCNC: 139 MMOL/L — SIGNIFICANT CHANGE UP (ref 135–145)
SP GR SPEC: 1 — LOW (ref 1.01–1.02)
URATE SERPL-MCNC: 4.7 MG/DL — SIGNIFICANT CHANGE UP (ref 2.5–7)
UROBILINOGEN FLD QL: SIGNIFICANT CHANGE UP
WBC # BLD: 11.24 K/UL — HIGH (ref 3.8–10.5)
WBC # FLD AUTO: 11.24 K/UL — HIGH (ref 3.8–10.5)
WBC UR QL: 15 /HPF — HIGH (ref 0–5)

## 2021-01-31 RX ORDER — SODIUM CHLORIDE 9 MG/ML
3 INJECTION INTRAMUSCULAR; INTRAVENOUS; SUBCUTANEOUS EVERY 8 HOURS
Refills: 0 | Status: DISCONTINUED | OUTPATIENT
Start: 2021-01-31 | End: 2021-01-31

## 2021-01-31 NOTE — H&P ADULT - NSHPLABSRESULTS_GEN_ALL_CORE
11.24>12.2/37.2<297    139 I 102 I 9  ----------------<85   AST-22; ALT-18; Uric acid-4.7; LDH-198  4.0 I 25 I 0.59    PT-12.3; INR-1.07; PTT-35.5; fibrinogen-790

## 2021-01-31 NOTE — H&P ADULT - HISTORY OF PRESENT ILLNESS
31yo female now a  s/p  on  here from home for evaluation of labile BP of 140/90 with rpt of 160/103. Pt reports pregnancy was uncomplicated and she went into labor, but in post partum time she started developing some labile BP's, but nothing that warranted medication or magnesium sulfate. Pt denies HA, visual changes, RUQ or epigastric pain.    Pmhx-denies  Pshx/Hosp-denies  Meds-PNV  Past ob- FT x 2; uncomp  Gyn-denies  NKDA

## 2021-01-31 NOTE — CHART NOTE - NSCHARTNOTEFT_GEN_A_CORE
Patient is a30y/o P2 s/p  on 21 presenting for evaluation as directed.  Patient reports SBP of 140's in the IP period.  States that she was instructed to check her BPs at home.  Today her BP was 160/103.  Denies headache, nausea, vomiting, visual disturbances, ruq/epigastric pain.

## 2021-01-31 NOTE — H&P ADULT - ASSESSMENT
31yo female  s/p  on , uncomplicated, here from home for eval of BP's of 140/90 & 160/103  -BP's here are normotensive  -HELLP labs pending 29yo female  s/p  on , uncomplicated, here from home for eval of BP's of 140/90 & 160/103  -BP's here are normotensive  -HELLP labs are normal range  -pt with no evidence of pre-eclampsia @ this time.   -pt was lesley Manjarrez, pt was [place] home with instructions to follow up in office   -signs and sx's of pre-eclampsia were reviewed.

## 2021-01-31 NOTE — H&P ADULT - NSHPPHYSICALEXAM_GEN_ALL_CORE
Gen: A&O x 3; NAD  Vitals: BP-137/78; 120/70; 123/75; 132/70; 120/69; 135/72    Pulm-CTA B/L; no wheezes  Cor-clear S1S2; no murmurs  Abd exam-soft and nontender Gen: A&O x 3; NAD  Vitals: BP-137/78; 120/70; 123/75; 132/70; 120/69; 135/12698/67    Pulm-CTA B/L; no wheezes  Cor-clear S1S2; no murmurs  Abd exam-soft and nontender

## 2021-02-01 ENCOUNTER — NON-APPOINTMENT (OUTPATIENT)
Age: 31
End: 2021-02-01

## 2021-02-08 ENCOUNTER — NON-APPOINTMENT (OUTPATIENT)
Age: 31
End: 2021-02-08

## 2021-02-18 ENCOUNTER — NON-APPOINTMENT (OUTPATIENT)
Age: 31
End: 2021-02-18

## 2021-02-26 ENCOUNTER — NON-APPOINTMENT (OUTPATIENT)
Age: 31
End: 2021-02-26

## 2021-04-23 NOTE — OB RN DELIVERY SUMMARY - NS_TIMEOFTRANSA_OBGYN_ALL_OB_DT
Patient Education     When Your Child Has a Urinary Tract Infection (UTI)    A urinary tract infection (UTI) is a bacterial infection in the urinary tract. The urinary tract is made up of the kidneys, ureters, bladder, and urethra. Children often get UTIs that affect the bladder. UTIs can be uncomfortable and painful. But with treatment, most children recover with no lasting effects.   What is the urinary tract?  The following body parts make up the urinary tract:      A urinary tract infection is caused by bacteria that enter the urinary tract.    · Kidneys filter waste from the blood and make urine.  · Ureters carry urine from the kidneys to the bladder.  · The bladder stores urine.  · The urethra carries urine from the bladder to the outside of the body.  What causes a UTI?  Most UTIs are caused by bacteria that enter the urinary tract through the urethra. The urinary tracts of boys and girls are slightly different. The urethra is shorter in girls. This makes it easier for bacteria to enter. As a result, girls are more likely than boys to get UTIs.   What are the symptoms of a UTI?  · If your child has a UTI affecting the bladder (cystitis), symptoms can include:  ? Painful urination  ? Frequent urination  ? Urgent need to urinate  ? Blood in the urine  ? Daytime wetting or nighttime bedwetting when previously continent  · If your child has a UTI affecting the kidneys (pyelonephritis), symptoms are similar to those of a bladder infection. They can also include:  ? Fever  ? Belly (abdominal) pain  ? Upset stomach (nausea) and vomiting  ? Cloudy urine  ? Strong-smelling urine    How is a UTI diagnosed?  · The healthcare provider asks about your child’s symptoms and health history. Your child is examined.  · A lab test, such as a urinalysis, is done. For this test, a urine sample is needed. It is checked for bacteria and other signs of infection. The urine is also sent for a culture. This is a test that identifies  what bacteria is growing in the urine. It can take 1 to 3 days to get results of a urine culture. If the provider thinks your child has a UTI, the provider will likely start treatment even before lab results come back.  · If your child has severe symptoms, other tests may be done. You’ll be told more about this, if needed.    How is a UTI treated?  · Symptoms of a UTI generally go away within 24 to 72 hours of starting treatment.  · The healthcare provider will prescribe antibiotics for your child. Make sure your child takes  all of the medicine, even if he or she starts feeling better.   · You can do the following at home to ease your child’s symptoms:  ? Give your child over-the-counter (OTC) medicines, such as ibuprofen or acetaminophen, to manage pain and fever. Don't give ibuprofen to a baby who is younger than 6 months old, or to a child who is dehydrated or constantly vomiting. Don’t give aspirin (or medicine that contains aspirin) to a child younger than age 19 unless directed by your child’s provider. Taking aspirin can put your child at risk for Reye syndrome. This is a rare but very serious disorder. It most often affects the brain and the liver.  ? Ask your provider about other medicines that can be prescribed to ease painful urination.  ? Give your child plenty of fluids to drink. Cranberry juice may help ease some pain symptoms.    · If a urine culture was done, make sure to get the results from the healthcare provider. Make an appointment to follow up about a week after your child has finished antibiotics.    When to call the healthcare provider   Call the healthcare provider if your child has any of these:   · Symptoms that don't improve within  48 hours of starting treatment  · Fever, typically greater than 100.5 degrees, or as directed by your healthcare provider  · A fever that goes away but returns after starting treatment  · Increased belly or back pain  · Signs of fluid loss (dehydration, such  as very dark or little urine, excessive thirst, dry mouth, or dizziness  · Vomiting or inability to tolerate prescribed antibiotics  · Child begins acting sicker  How is a UTI prevented?  · Encourage your child to drink plenty of fluids.  · Encourage your child to empty the bladder all the way when urinating.  · Teach girls to wipe from the front to back when using the bathroom.  · Don't use bubble bath.  · Don't allow your child to become constipated.  · If your child has a UTI, he or she may need ultrasound imaging of the kidneys and bladder. This helps the healthcare provider rule out possible anatomical problems that could cause a UTI. If problems are found, or if your child has repeated UTIs, more imaging tests may be helpful.    [place] last reviewed this educational content on 2019  © 8536-1932 [employer]. All rights reserved. This information is not intended as a substitute for professional medical care. Always follow your healthcare professional's instructions.            09-Oct-2019 06:10

## 2021-05-21 NOTE — OB RN PATIENT PROFILE - MENTAL HEALTH CONDITIONS/SYMPTOMS, PROFILE

## 2021-11-11 NOTE — OB PROVIDER TRIAGE NOTE - NS_ABDFINDING_OBGYN_ALL_OB
19 y/o F c/o one day of HA, body aches, and subjective fever with chills. No sick contacts. Rapid covid-19 at home negative. Pt does have a history of asthma, no cough. Grandmother brought Pt to ER; Consent from mother over phone. Mother is requesting covid-29 PCR testing. Physical Exam: VS reviewed. Pt is well appearing, in no distress. MMM. Cap refill <2 seconds. TMs normal b/l, no erythema, no dullness. Pharynx with no erythema, no exudates, no stomatitis. No anterior cervical lymph nodes appreciated. No skin rash noted. Chest is clear, no wheezing, rales or crackles. No retractions, no distress. Normal and equal breath sounds. Normal heart sounds, no muffling, no murmur appreciated. Abdomen soft, NT/ND, no guarding, no localized tenderness.  Neuro exam grossly intact. Plan: Motrin and Covid-19 PCR at mother's request. Soft, nontender

## 2022-01-19 NOTE — OB RN PATIENT PROFILE - HAS THE PATIENT RECEIVED THE INFLUENZA VACCINE THIS SEASON?
Health Maintenance Due   Topic Date Due    COVID-19 Vaccine (1) Never done    TETANUS VACCINE  Never done    Shingles Vaccine (1 of 2) Never done    Influenza Vaccine (1) 09/01/2021     Updates were requested from care everywhere.  Chart was reviewed for overdue Proactive [facility] Encounters ([facility]) topics (CRS, Breast Cancer Screening, Eye exam)  Health Maintenance has been updated.  LINKS immunization registry triggered.  Immunizations were reconciled.      
no...

## 2022-04-22 ENCOUNTER — ASOB RESULT (OUTPATIENT)
Age: 32
End: 2022-04-22

## 2022-04-22 ENCOUNTER — APPOINTMENT (OUTPATIENT)
Dept: ANTEPARTUM | Facility: CLINIC | Age: 32
End: 2022-04-22
Payer: COMMERCIAL

## 2022-04-22 ENCOUNTER — LABORATORY RESULT (OUTPATIENT)
Age: 32
End: 2022-04-22

## 2022-04-22 PROCEDURE — 76801 OB US < 14 WKS SINGLE FETUS: CPT | Mod: 59

## 2022-04-22 PROCEDURE — 76813 OB US NUCHAL MEAS 1 GEST: CPT

## 2022-04-22 PROCEDURE — 36416 COLLJ CAPILLARY BLOOD SPEC: CPT

## 2022-06-20 ENCOUNTER — ASOB RESULT (OUTPATIENT)
Age: 32
End: 2022-06-20

## 2022-06-20 ENCOUNTER — APPOINTMENT (OUTPATIENT)
Dept: ANTEPARTUM | Facility: CLINIC | Age: 32
End: 2022-06-20
Payer: COMMERCIAL

## 2022-06-20 PROCEDURE — 76811 OB US DETAILED SNGL FETUS: CPT

## 2022-10-12 ENCOUNTER — INPATIENT (INPATIENT)
Facility: HOSPITAL | Age: 32
LOS: 2 days | Discharge: HOME | End: 2022-10-15
Attending: OBSTETRICS & GYNECOLOGY | Admitting: OBSTETRICS & GYNECOLOGY

## 2022-10-12 VITALS
SYSTOLIC BLOOD PRESSURE: 144 MMHG | TEMPERATURE: 99 F | DIASTOLIC BLOOD PRESSURE: 78 MMHG | RESPIRATION RATE: 16 BRPM | HEART RATE: 102 BPM

## 2022-10-12 DIAGNOSIS — O26.899 OTHER SPECIFIED PREGNANCY RELATED CONDITIONS, UNSPECIFIED TRIMESTER: ICD-10-CM

## 2022-10-12 DIAGNOSIS — Z3A.00 WEEKS OF GESTATION OF PREGNANCY NOT SPECIFIED: ICD-10-CM

## 2022-10-12 DIAGNOSIS — I10 ESSENTIAL (PRIMARY) HYPERTENSION: ICD-10-CM

## 2022-10-12 LAB
ALBUMIN SERPL ELPH-MCNC: 3.5 G/DL — SIGNIFICANT CHANGE UP (ref 3.3–5)
ALP SERPL-CCNC: 136 U/L — HIGH (ref 40–120)
ALT FLD-CCNC: 15 U/L — SIGNIFICANT CHANGE UP (ref 4–33)
ANION GAP SERPL CALC-SCNC: 15 MMOL/L — HIGH (ref 7–14)
APPEARANCE UR: CLEAR — SIGNIFICANT CHANGE UP
APTT BLD: 30.3 SEC — SIGNIFICANT CHANGE UP (ref 27–36.3)
AST SERPL-CCNC: 17 U/L — SIGNIFICANT CHANGE UP (ref 4–32)
BASOPHILS # BLD AUTO: 0.01 K/UL — SIGNIFICANT CHANGE UP (ref 0–0.2)
BASOPHILS NFR BLD AUTO: 0.1 % — SIGNIFICANT CHANGE UP (ref 0–2)
BILIRUB SERPL-MCNC: 0.2 MG/DL — SIGNIFICANT CHANGE UP (ref 0.2–1.2)
BILIRUB UR-MCNC: NEGATIVE — SIGNIFICANT CHANGE UP
BUN SERPL-MCNC: 7 MG/DL — SIGNIFICANT CHANGE UP (ref 7–23)
CALCIUM SERPL-MCNC: 9.8 MG/DL — SIGNIFICANT CHANGE UP (ref 8.4–10.5)
CHLORIDE SERPL-SCNC: 102 MMOL/L — SIGNIFICANT CHANGE UP (ref 98–107)
CO2 SERPL-SCNC: 20 MMOL/L — LOW (ref 22–31)
COLOR SPEC: SIGNIFICANT CHANGE UP
CREAT ?TM UR-MCNC: 41 MG/DL — SIGNIFICANT CHANGE UP
CREAT SERPL-MCNC: 0.46 MG/DL — LOW (ref 0.5–1.3)
DIFF PNL FLD: NEGATIVE — SIGNIFICANT CHANGE UP
EGFR: 131 ML/MIN/1.73M2 — SIGNIFICANT CHANGE UP
EOSINOPHIL # BLD AUTO: 0.06 K/UL — SIGNIFICANT CHANGE UP (ref 0–0.5)
EOSINOPHIL NFR BLD AUTO: 0.6 % — SIGNIFICANT CHANGE UP (ref 0–6)
FIBRINOGEN PPP-MCNC: 894 MG/DL — HIGH (ref 330–520)
GLUCOSE SERPL-MCNC: 77 MG/DL — SIGNIFICANT CHANGE UP (ref 70–99)
GLUCOSE UR QL: NEGATIVE — SIGNIFICANT CHANGE UP
HCT VFR BLD CALC: 43.2 % — SIGNIFICANT CHANGE UP (ref 34.5–45)
HGB BLD-MCNC: 14.4 G/DL — SIGNIFICANT CHANGE UP (ref 11.5–15.5)
IANC: 6.98 K/UL — SIGNIFICANT CHANGE UP (ref 1.8–7.4)
IMM GRANULOCYTES NFR BLD AUTO: 0.5 % — SIGNIFICANT CHANGE UP (ref 0–0.9)
INR BLD: 1.02 RATIO — SIGNIFICANT CHANGE UP (ref 0.88–1.16)
KETONES UR-MCNC: ABNORMAL
LDH SERPL L TO P-CCNC: 166 U/L — SIGNIFICANT CHANGE UP (ref 135–225)
LEUKOCYTE ESTERASE UR-ACNC: NEGATIVE — SIGNIFICANT CHANGE UP
LYMPHOCYTES # BLD AUTO: 2.3 K/UL — SIGNIFICANT CHANGE UP (ref 1–3.3)
LYMPHOCYTES # BLD AUTO: 23 % — SIGNIFICANT CHANGE UP (ref 13–44)
MCHC RBC-ENTMCNC: 29.3 PG — SIGNIFICANT CHANGE UP (ref 27–34)
MCHC RBC-ENTMCNC: 33.3 GM/DL — SIGNIFICANT CHANGE UP (ref 32–36)
MCV RBC AUTO: 88 FL — SIGNIFICANT CHANGE UP (ref 80–100)
MONOCYTES # BLD AUTO: 0.62 K/UL — SIGNIFICANT CHANGE UP (ref 0–0.9)
MONOCYTES NFR BLD AUTO: 6.2 % — SIGNIFICANT CHANGE UP (ref 2–14)
NEUTROPHILS # BLD AUTO: 6.98 K/UL — SIGNIFICANT CHANGE UP (ref 1.8–7.4)
NEUTROPHILS NFR BLD AUTO: 69.6 % — SIGNIFICANT CHANGE UP (ref 43–77)
NITRITE UR-MCNC: NEGATIVE — SIGNIFICANT CHANGE UP
NRBC # BLD: 0 /100 WBCS — SIGNIFICANT CHANGE UP (ref 0–0)
NRBC # FLD: 0 K/UL — SIGNIFICANT CHANGE UP (ref 0–0)
PH UR: 6 — SIGNIFICANT CHANGE UP (ref 5–8)
PLATELET # BLD AUTO: 237 K/UL — SIGNIFICANT CHANGE UP (ref 150–400)
POTASSIUM SERPL-MCNC: 3.5 MMOL/L — SIGNIFICANT CHANGE UP (ref 3.5–5.3)
POTASSIUM SERPL-SCNC: 3.5 MMOL/L — SIGNIFICANT CHANGE UP (ref 3.5–5.3)
PROT ?TM UR-MCNC: 8 MG/DL — SIGNIFICANT CHANGE UP
PROT ?TM UR-MCNC: 8 MG/DL — SIGNIFICANT CHANGE UP
PROT SERPL-MCNC: 6.7 G/DL — SIGNIFICANT CHANGE UP (ref 6–8.3)
PROT UR-MCNC: NEGATIVE — SIGNIFICANT CHANGE UP
PROT/CREAT UR-RTO: 0.2 RATIO — SIGNIFICANT CHANGE UP (ref 0–0.2)
PROTHROM AB SERPL-ACNC: 11.8 SEC — SIGNIFICANT CHANGE UP (ref 10.5–13.4)
RBC # BLD: 4.91 M/UL — SIGNIFICANT CHANGE UP (ref 3.8–5.2)
RBC # FLD: 13 % — SIGNIFICANT CHANGE UP (ref 10.3–14.5)
SODIUM SERPL-SCNC: 137 MMOL/L — SIGNIFICANT CHANGE UP (ref 135–145)
SP GR SPEC: 1.01 — SIGNIFICANT CHANGE UP (ref 1.01–1.05)
URATE SERPL-MCNC: 3.1 MG/DL — SIGNIFICANT CHANGE UP (ref 2.5–7)
UROBILINOGEN FLD QL: SIGNIFICANT CHANGE UP
WBC # BLD: 10.02 K/UL — SIGNIFICANT CHANGE UP (ref 3.8–10.5)
WBC # FLD AUTO: 10.02 K/UL — SIGNIFICANT CHANGE UP (ref 3.8–10.5)

## 2022-10-12 RX ORDER — NIFEDIPINE 30 MG
30 TABLET, EXTENDED RELEASE 24 HR ORAL DAILY
Refills: 0 | Status: DISCONTINUED | OUTPATIENT
Start: 2022-10-12 | End: 2022-10-15

## 2022-10-12 RX ORDER — OXYTOCIN 10 UNIT/ML
333.33 VIAL (ML) INJECTION
Qty: 20 | Refills: 0 | Status: DISCONTINUED | OUTPATIENT
Start: 2022-10-12 | End: 2022-10-13

## 2022-10-12 RX ORDER — SODIUM CHLORIDE 9 MG/ML
1000 INJECTION, SOLUTION INTRAVENOUS
Refills: 0 | Status: DISCONTINUED | OUTPATIENT
Start: 2022-10-12 | End: 2022-10-13

## 2022-10-12 RX ADMIN — Medication 30 MILLIGRAM(S): at 23:23

## 2022-10-12 RX ADMIN — SODIUM CHLORIDE 125 MILLILITER(S): 9 INJECTION, SOLUTION INTRAVENOUS at 23:23

## 2022-10-12 NOTE — OB PROVIDER H&P - NS_PRENATALLABSOURCEGBS36PN_OBGYN_ALL_OB
Problem: Safety  Goal: Will remain free from falls  Outcome: PROGRESSING AS EXPECTED  Will assist Mom with transfers       negative

## 2022-10-12 NOTE — OB PROVIDER H&P - NSHPPHYSICALEXAM_GEN_ALL_CORE
Vital Signs Last 24 Hrs  T(C): 37.4 (12 Oct 2022 17:56), Max: 37.4 (12 Oct 2022 17:56)  T(F): 99.3 (12 Oct 2022 17:56), Max: 99.3 (12 Oct 2022 17:56)  HR: 111 (12 Oct 2022 21:27) (96 - 111)  BP: 139/67 (12 Oct 2022 21:27) (131/68 - 145/78)  RR: 16 (12 Oct 2022 17:56) (16 - 16)      Parameters below as of 12 Oct 2022 17:56  Patient On (Oxygen Delivery Method): room air          Gen: NAD  Head: NC/AT  Cardio: S1S2+, RRR  Resp: CTABL, no wheezing  Abdomen: Soft, NT/ND, BS+  Extremities: No LE edema bilaterally    NST and BPP done to assess fetal surveillance and results as follows:  NST-->FHR: 135 HR baseline, moderate variability, accelerations present, no decelerations, Category 1.  [place]: Contractions present, irregular,    BPP:      SVE: 2.5/40/-3

## 2022-10-12 NOTE — OB PROVIDER TRIAGE NOTE - NSHPPHYSICALEXAM_GEN_ALL_CORE
Vital Signs Last 24 Hrs  T(C): 37.4 (12 Oct 2022 17:56), Max: 37.4 (12 Oct 2022 17:56)  T(F): 99.3 (12 Oct 2022 17:56), Max: 99.3 (12 Oct 2022 17:56)  HR: 111 (12 Oct 2022 21:27) (96 - 111)  BP: 139/67 (12 Oct 2022 21:27) (131/68 - 145/78)  RR: 16 (12 Oct 2022 17:56) (16 - 16)      Parameters below as of 12 Oct 2022 17:56  Patient On (Oxygen Delivery Method): room air          Gen: NAD  Head: NC/AT  Cardio: S1S2+, RRR  Resp: CTABL, no wheezing  Abdomen: Soft, NT/ND, BS+  Extremities: No LE edema bilaterally    NST and BPP done to assess fetal surveillance and results as follows:  NST-->FHR: 135 HR baseline, moderate variability, accelerations present, no decelerations, Category 1.  [place]: Contractions present, irregular,    BPP:      SVE: 0.5/long/-3 Vital Signs Last 24 Hrs  T(C): 37.4 (12 Oct 2022 17:56), Max: 37.4 (12 Oct 2022 17:56)  T(F): 99.3 (12 Oct 2022 17:56), Max: 99.3 (12 Oct 2022 17:56)  HR: 111 (12 Oct 2022 21:27) (96 - 111)  BP: 139/67 (12 Oct 2022 21:27) (131/68 - 145/78)  RR: 16 (12 Oct 2022 17:56) (16 - 16)      Parameters below as of 12 Oct 2022 17:56  Patient On (Oxygen Delivery Method): room air          Gen: NAD  Head: NC/AT  Cardio: S1S2+, RRR  Resp: CTABL, no wheezing  Abdomen: Soft, NT/ND, BS+  Extremities: No LE edema bilaterally    NST and BPP done to assess fetal surveillance and results as follows:  NST-->FHR: 135 HR baseline, moderate variability, accelerations present, no decelerations, Category 1.  [place]: Contractions present, irregular,    BPP:      SVE: 2.5/40/-3

## 2022-10-12 NOTE — OB RN TRIAGE NOTE - FALL HARM RISK - UNIVERSAL INTERVENTIONS
Bed in lowest position, wheels locked, appropriate side rails in place/Call bell, personal items and telephone in reach/Instruct patient to call for assistance before getting out of bed or chair/Non-slip footwear when patient is out of bed/[place] to call system/Physically safe environment - no spills, clutter or unnecessary equipment/Purposeful Proactive Rounding/Room/bathroom lighting operational, light cord in reach

## 2022-10-12 NOTE — OB PROVIDER TRIAGE NOTE - NSOBPROVIDERNOTE_OBGYN_ALL_OB_FT
33 yo , EGA@37 weeks elevated blood pressure.   discuss with Dr. Christopher.  pt to be admitted for elevated blood pressure  for IOL with PO Cytotec  procardia 30 XL daily.   For epi PRN  routine orders  meds ordered  covid pcr sent  consents signed by patient.     discuss with Dr. Salazar PGY-3  raymundo Ryan NP

## 2022-10-12 NOTE — OB PROVIDER IHI INDUCTION/AUGMENTATION NOTE - NS_EFW_OBGYN_ALL_OB_FT
The centralized order processing team has placed an Oncology Genomic Profile order for Anupama Lara    Stage: IV  Test ordered: [employer] CDx  Specimen:  [id number]    Once the company sends a request, our pathology department will ship the specimen and sequencing will begin following receipt. Once the test result is available, it will be uploaded in EPIC. You will be notified via [facility] staff message when results are available.     Anticipated test report date is approximately 4 weeks from this notification. If you would more specific information about where the test is in the process please reach out to one of the [facility] staff.     Danyell Murphy RN   On behalf of the [facility]   
[id number]

## 2022-10-12 NOTE — OB PROVIDER H&P - HISTORY OF PRESENT ILLNESS
31 yo , EGA@37 weeks, presented to D&T from her OB office with c/o elevated blood pressure 146/90's, contractions irregular, every 10-15 minutes, around 4PM, pain 5-6/10. Denies vaginal bleeding, leakage of fluid, and reports positive fetal movement.  Denies fever, chills, headaches, changes in vision, chest pain, palpitations, shortness of breath, cough, nausea, vomiting, diarrhea, constipation, urinary symptoms, edema.  pt stated that she had VE checked at the office today she was 2.5cm  Prenatal care with Dr. Long  Prenatal course is uncomplicated as per patient    GBS status is negative 2022  Patient denies signs and symptoms of COVID 19; denies symptomatic illness; fully vaccinated.    No adverse reactions to anesthesia, no objections to blood transfusions if clinically indicated.  OB hx:   10/9/19  8-2 39 weeks elevated BP pp no meds  21  7-6  38 weeks elevated BP pp no meds  Med hx:  denies  Surg hx:  denies  GYN hx: denies hx of abnormal papsmear/cysts/fibroids/STDs  Meds: PNV  Allergies: NKDA    Social hx: Denies alcohol, tobacco, drug use  Psych hx: denies hx of anxiety/depression; lives with spouse and 2 boys

## 2022-10-12 NOTE — OB PROVIDER TRIAGE NOTE - HISTORY OF PRESENT ILLNESS
40 yo , EGA@39.3 weeks, presented to D&T with c/o contractions irregular, every 10-15 minutes, denies vaginal bleeding, leakage of fluid, and reports fetal movement.  Denies fever, chills, headaches, changes in vision, chest pain, palpitations, shortness of breath, cough, nausea, vomiting, diarrhea, constipation, urinary symptoms, edema.    Prenatal care with   Prenatal course is uncomplicated. Patient was admitted for 2 days due to nephrolithiasis, received IVF hydration and pain medication.    GBS status is negative.  Patient denies signs and symptoms of COVID 19; denies symptomatic illness; fully vaccinated.    No adverse reactions to anesthesia, no objections to blood transfusions if clinically indicated.  OB hx: primigravida  Med hx:  Surg hx:  GYN hx: denies hx of abnormal papsmear/cysts/fibroids/STDs  Meds:  Allergies:    Social hx: Denies alcohol, tobacco, drug use  Psych hx: denies hx of anxiety/depression; lives with spouse     33 yo , EGA@37 weeks, presented to D&T from her OB office with c/o elevated blood pressure 146/90's,  contractions irregular, every 10-15 minutes, around 4PM, denies vaginal bleeding, leakage of fluid, and reports positive fetal movement.  Denies fever, chills, headaches, changes in vision, chest pain, palpitations, shortness of breath, cough, nausea, vomiting, diarrhea, constipation, urinary symptoms, edema.  pt stated that she had VE checked at the office today she was 2.5cm  Prenatal care with Dr. Long  Prenatal course is uncomplicated as per patient    GBS status is negative as per patient.  Patient denies signs and symptoms of COVID 19; denies symptomatic illness; fully vaccinated.    No adverse reactions to anesthesia, no objections to blood transfusions if clinically indicated.  OB hx:   10/9/19  8-2 39 weeks elevated BP pp no meds  21  7-6  38 weeks elevated BP pp no meds  Med hx:  denies  Surg hx:  denies  GYN hx: denies hx of abnormal papsmear/cysts/fibroids/STDs  Meds: PNV  Allergies: NKDA    Social hx: Denies alcohol, tobacco, drug use  Psych hx: denies hx of anxiety/depression; lives with spouse and 2 boys     33 yo , EGA@37 weeks, presented to D&T from her OB office with c/o elevated blood pressure 146/90's, contractions irregular, every 10-15 minutes, around 4PM, pain 5-6/10. Denies vaginal bleeding, leakage of fluid, and reports positive fetal movement.  Denies fever, chills, headaches, changes in vision, chest pain, palpitations, shortness of breath, cough, nausea, vomiting, diarrhea, constipation, urinary symptoms, edema.  pt stated that she had VE checked at the office today she was 2.5cm  Prenatal care with Dr. Long  Prenatal course is uncomplicated as per patient    GBS status is negative 2022  Patient denies signs and symptoms of COVID 19; denies symptomatic illness; fully vaccinated.    No adverse reactions to anesthesia, no objections to blood transfusions if clinically indicated.  OB hx:   10/9/19  8-2 39 weeks elevated BP pp no meds  21  7-6  38 weeks elevated BP pp no meds  Med hx:  denies  Surg hx:  denies  GYN hx: denies hx of abnormal papsmear/cysts/fibroids/STDs  Meds: PNV  Allergies: NKDA    Social hx: Denies alcohol, tobacco, drug use  Psych hx: denies hx of anxiety/depression; lives with spouse and 2 boys

## 2022-10-12 NOTE — OB RN PATIENT PROFILE - FALL HARM RISK - UNIVERSAL INTERVENTIONS
Bed in lowest position, wheels locked, appropriate side rails in place/Call bell, personal items and telephone in reach/Instruct patient to call for assistance before getting out of bed or chair/Non-slip footwear when patient is out of bed/[place] to call system/Physically safe environment - no spills, clutter or unnecessary equipment/Purposeful Proactive Rounding/Room/bathroom lighting operational, light cord in reach

## 2022-10-12 NOTE — OB PROVIDER TRIAGE NOTE - NSHPLABSRESULTS_GEN_ALL_CORE
CBC Full  -  ( 12 Oct 2022 19:49 )  WBC Count : 10.02 K/uL  RBC Count : 4.91 M/uL  Hemoglobin : 14.4 g/dL  Hematocrit : 43.2 %  Platelet Count - Automated : 237 K/uL  Mean Cell Volume : 88.0 fL  Mean Cell Hemoglobin : 29.3 pg  Mean Cell Hemoglobin Concentration : 33.3 gm/dL  Auto Neutrophil # : 6.98 K/uL  Auto Lymphocyte # : 2.30 K/uL  Auto Monocyte # : 0.62 K/uL  Auto Eosinophil # : 0.06 K/uL  Auto Basophil # : 0.01 K/uL  Auto Neutrophil % : 69.6 %  Auto Lymphocyte % : 23.0 %  Auto Monocyte % : 6.2 %  Auto Eosinophil % : 0.6 %  Auto Basophil % : 0.1 %    10-12    137  |  102  |  7   ----------------------------<  77  3.5   |  20<L>  |  0.46<L>    Ca    9.8      12 Oct 2022 19:49    TPro  6.7  /  Alb  3.5  /  TBili  0.2  /  DBili  x   /  AST  17  /  ALT  15  /  AlkPhos  136<H>  10    PT/INR - ( 12 Oct 2022 19:49 )   PT: 11.8 sec;   INR: 1.02 ratio         PTT - ( 12 Oct 2022 19:49 )  PTT:30.3 sec  fibrinogen 894  Urinalysis Basic - ( 12 Oct 2022 19:49 )    Color: Light Yellow / Appearance: Clear / S.013 / pH: x  Gluc: x / Ketone: Small  / Bili: Negative / Urobili: <2 mg/dL   Blood: x / Protein: Negative / Nitrite: Negative   Leuk Esterase: Negative / RBC: x / WBC x   Sq Epi: x / Non Sq Epi: x / Bacteria: x    PCR 0.2

## 2022-10-12 NOTE — OB RN PATIENT PROFILE - AS HEIGHT TYPE
Subjective:       Patient ID: Quang Avalos stated

## 2022-10-13 ENCOUNTER — TRANSCRIPTION ENCOUNTER (OUTPATIENT)
Age: 32
End: 2022-10-13

## 2022-10-13 ENCOUNTER — RESULT REVIEW (OUTPATIENT)
Age: 32
End: 2022-10-13

## 2022-10-13 LAB
BLD GP AB SCN SERPL QL: NEGATIVE — SIGNIFICANT CHANGE UP
COVID-19 SPIKE DOMAIN AB INTERP: POSITIVE
COVID-19 SPIKE DOMAIN ANTIBODY RESULT: >250 U/ML — HIGH
RH IG SCN BLD-IMP: POSITIVE — SIGNIFICANT CHANGE UP
SARS-COV-2 IGG+IGM SERPL QL IA: >250 U/ML — HIGH
SARS-COV-2 IGG+IGM SERPL QL IA: POSITIVE
SARS-COV-2 RNA SPEC QL NAA+PROBE: SIGNIFICANT CHANGE UP
T PALLIDUM AB TITR SER: NEGATIVE — SIGNIFICANT CHANGE UP

## 2022-10-13 PROCEDURE — 93010 ELECTROCARDIOGRAM REPORT: CPT

## 2022-10-13 RX ORDER — BENZOCAINE 10 %
1 GEL (GRAM) MUCOUS MEMBRANE EVERY 6 HOURS
Refills: 0 | Status: DISCONTINUED | OUTPATIENT
Start: 2022-10-13 | End: 2022-10-15

## 2022-10-13 RX ORDER — HYDROCORTISONE 1 %
1 OINTMENT (GRAM) TOPICAL EVERY 6 HOURS
Refills: 0 | Status: DISCONTINUED | OUTPATIENT
Start: 2022-10-13 | End: 2022-10-15

## 2022-10-13 RX ORDER — IBUPROFEN 200 MG
600 TABLET ORAL EVERY 6 HOURS
Refills: 0 | Status: COMPLETED | OUTPATIENT
Start: 2022-10-13 | End: 2023-09-11

## 2022-10-13 RX ORDER — TETANUS TOXOID, REDUCED DIPHTHERIA TOXOID AND ACELLULAR PERTUSSIS VACCINE, ADSORBED 5; 2.5; 8; 8; 2.5 [IU]/.5ML; [IU]/.5ML; UG/.5ML; UG/.5ML; UG/.5ML
0.5 SUSPENSION INTRAMUSCULAR ONCE
Refills: 0 | Status: DISCONTINUED | OUTPATIENT
Start: 2022-10-13 | End: 2022-10-15

## 2022-10-13 RX ORDER — SODIUM CHLORIDE 9 MG/ML
3 INJECTION INTRAMUSCULAR; INTRAVENOUS; SUBCUTANEOUS EVERY 8 HOURS
Refills: 0 | Status: DISCONTINUED | OUTPATIENT
Start: 2022-10-13 | End: 2022-10-15

## 2022-10-13 RX ORDER — AER TRAVELER 0.5 G/1
1 SOLUTION RECTAL; TOPICAL EVERY 4 HOURS
Refills: 0 | Status: DISCONTINUED | OUTPATIENT
Start: 2022-10-13 | End: 2022-10-15

## 2022-10-13 RX ORDER — DIBUCAINE 1 %
1 OINTMENT (GRAM) RECTAL
Qty: 0 | Refills: 0 | DISCHARGE
Start: 2022-10-13

## 2022-10-13 RX ORDER — DIPHENHYDRAMINE HCL 50 MG
25 CAPSULE ORAL EVERY 6 HOURS
Refills: 0 | Status: DISCONTINUED | OUTPATIENT
Start: 2022-10-13 | End: 2022-10-15

## 2022-10-13 RX ORDER — IBUPROFEN 200 MG
600 TABLET ORAL EVERY 6 HOURS
Refills: 0 | Status: DISCONTINUED | OUTPATIENT
Start: 2022-10-13 | End: 2022-10-15

## 2022-10-13 RX ORDER — KETOROLAC TROMETHAMINE 30 MG/ML
30 SYRINGE (ML) INJECTION ONCE
Refills: 0 | Status: DISCONTINUED | OUTPATIENT
Start: 2022-10-13 | End: 2022-10-13

## 2022-10-13 RX ORDER — NIFEDIPINE 30 MG
1 TABLET, EXTENDED RELEASE 24 HR ORAL
Qty: 0 | Refills: 0 | DISCHARGE
Start: 2022-10-13

## 2022-10-13 RX ORDER — DIBUCAINE 1 %
1 OINTMENT (GRAM) RECTAL EVERY 6 HOURS
Refills: 0 | Status: DISCONTINUED | OUTPATIENT
Start: 2022-10-13 | End: 2022-10-15

## 2022-10-13 RX ORDER — SIMETHICONE 80 MG/1
80 TABLET, CHEWABLE ORAL EVERY 4 HOURS
Refills: 0 | Status: DISCONTINUED | OUTPATIENT
Start: 2022-10-13 | End: 2022-10-15

## 2022-10-13 RX ORDER — OXYTOCIN 10 UNIT/ML
333.33 VIAL (ML) INJECTION
Qty: 20 | Refills: 0 | Status: DISCONTINUED | OUTPATIENT
Start: 2022-10-13 | End: 2022-10-15

## 2022-10-13 RX ORDER — MAGNESIUM HYDROXIDE 400 MG/1
30 TABLET, CHEWABLE ORAL
Refills: 0 | Status: DISCONTINUED | OUTPATIENT
Start: 2022-10-13 | End: 2022-10-15

## 2022-10-13 RX ORDER — OXYCODONE HYDROCHLORIDE 5 MG/1
5 TABLET ORAL ONCE
Refills: 0 | Status: DISCONTINUED | OUTPATIENT
Start: 2022-10-13 | End: 2022-10-15

## 2022-10-13 RX ORDER — IBUPROFEN 200 MG
1 TABLET ORAL
Qty: 0 | Refills: 0 | DISCHARGE
Start: 2022-10-13

## 2022-10-13 RX ORDER — ACETAMINOPHEN 500 MG
975 TABLET ORAL
Refills: 0 | Status: DISCONTINUED | OUTPATIENT
Start: 2022-10-13 | End: 2022-10-15

## 2022-10-13 RX ORDER — OXYCODONE HYDROCHLORIDE 5 MG/1
5 TABLET ORAL
Refills: 0 | Status: DISCONTINUED | OUTPATIENT
Start: 2022-10-13 | End: 2022-10-15

## 2022-10-13 RX ORDER — LANOLIN
1 OINTMENT (GRAM) TOPICAL EVERY 6 HOURS
Refills: 0 | Status: DISCONTINUED | OUTPATIENT
Start: 2022-10-13 | End: 2022-10-15

## 2022-10-13 RX ORDER — OXYTOCIN 10 UNIT/ML
2 VIAL (ML) INJECTION
Qty: 30 | Refills: 0 | Status: DISCONTINUED | OUTPATIENT
Start: 2022-10-13 | End: 2022-10-13

## 2022-10-13 RX ORDER — PRAMOXINE HYDROCHLORIDE 150 MG/15G
1 AEROSOL, FOAM RECTAL EVERY 4 HOURS
Refills: 0 | Status: DISCONTINUED | OUTPATIENT
Start: 2022-10-13 | End: 2022-10-15

## 2022-10-13 RX ORDER — INFLUENZA VIRUS VACCINE 15; 15; 15; 15 UG/.5ML; UG/.5ML; UG/.5ML; UG/.5ML
0.5 SUSPENSION INTRAMUSCULAR ONCE
Refills: 0 | Status: COMPLETED | OUTPATIENT
Start: 2022-10-13 | End: 2022-10-14

## 2022-10-13 RX ADMIN — Medication 30 MILLIGRAM(S): at 23:04

## 2022-10-13 RX ADMIN — Medication 30 MILLIGRAM(S): at 19:37

## 2022-10-13 RX ADMIN — Medication 600 MILLIGRAM(S): at 23:05

## 2022-10-13 RX ADMIN — SODIUM CHLORIDE 3 MILLILITER(S): 9 INJECTION INTRAMUSCULAR; INTRAVENOUS; SUBCUTANEOUS at 21:47

## 2022-10-13 NOTE — OB RN DELIVERY SUMMARY - NSSELHIDDEN_OBGYN_ALL_OB_FT
[NS_DeliveryAttending1_OBGYN_ALL_OB_FT:[email or web address]],[NS_DeliveryRN_OBGYN_ALL_OB_FT:[id number]]

## 2022-10-13 NOTE — OB RN DELIVERY SUMMARY - NS_SEPSISRSKCALC_OBGYN_ALL_OB_FT
EOS calculated successfully. EOS Risk Factor: 0.5/1000 live births ([facility] national incidence); GA=37w1d; Temp=99.3; ROM=3.733; GBS='Negative'; Antibiotics='No antibiotics or any antibiotics < 2 hrs prior to birth'

## 2022-10-13 NOTE — DISCHARGE NOTE OB - PATIENT PORTAL LINK FT
You can access the FollowMyHealth Patient Portal offered by [facility] by registering at the following website: [email or web address]. By joining [facility]’s FollowMyHealth portal, you will also be able to view your health information using other applications (apps) compatible with our system.

## 2022-10-13 NOTE — OB PROVIDER DELIVERY SUMMARY - NSPROVIDERDELIVERYNOTE_OBGYN_ALL_OB_FT
Delivery of liveborn male infant from JANELLE position. Head, shoulders, and body delivered without complication.  Nuchal x 2. Infant was suctioned. terminal mec. Delayed cord clamping and infant was passed to mother. Cord clamped and cut. Placenta delivered intact with a 3 vessel cord. Fundal massage was given and uterine fundus was found to be firm. Vaginal exam revealed an intact cervix, vaginal walls and sulci. Patient had a second degree laceration in the perineum that was repaired with 2.0 chromic suture. Excellent hemostasis was noted. Patient was stable and went to recovery. Count was correct x 2.

## 2022-10-13 NOTE — CHART NOTE - NSCHARTNOTEFT_GEN_A_CORE
pt was evaluated at bedside; bloody show  ve: 4/70-2 SROM? BLOODY SHOW [facility] place   category 1 tracing   start pitocin augmentation   continue maternal/ fetal monitoring

## 2022-10-13 NOTE — DISCHARGE NOTE OB - MEDICATION SUMMARY - MEDICATIONS TO TAKE
I will START or STAY ON the medications listed below when I get home from the hospital:    acetaminophen 325 mg oral tablet  -- 3 tab(s) by mouth every 6 hours, As Needed  -- Indication: For pain    ibuprofen 600 mg oral tablet  -- 1 tab(s) by mouth every 6 hours, As Needed  -- Indication: For pain    NIFEdipine 30 mg oral tablet, extended release  -- 1 tab(s) by mouth once a day  -- Indication: For Hypertensive disorder    dibucaine 1% topical ointment  -- 1 application on skin every 6 hours, As needed, Perineal discomfort  -- Indication: For  (normal spontaneous vaginal delivery)    lanolin topical ointment  -- 1 application on skin every 6 hours, As needed, nipple soreness  -- Indication: For Nipple soreness

## 2022-10-13 NOTE — DISCHARGE NOTE OB - CARE PROVIDER_API CALL
Arelis Collado)  Obstetrics and Gynecology  [address] [place] [address]  Phone: [phone]  Fax: [phone]  Follow Up Time: 1-3 days

## 2022-10-13 NOTE — DISCHARGE NOTE OB - NS MD DC FALL RISK RISK
For information on Fall & Injury Prevention, visit: [email or web address] OR  [email or web address] OR  https://www.cdc.gov/steadi/patient.html

## 2022-10-14 LAB
BASOPHILS # BLD AUTO: 0.03 K/UL — SIGNIFICANT CHANGE UP (ref 0–0.2)
BASOPHILS NFR BLD AUTO: 0.2 % — SIGNIFICANT CHANGE UP (ref 0–2)
EOSINOPHIL # BLD AUTO: 0.08 K/UL — SIGNIFICANT CHANGE UP (ref 0–0.5)
EOSINOPHIL NFR BLD AUTO: 0.6 % — SIGNIFICANT CHANGE UP (ref 0–6)
HCT VFR BLD CALC: 38 % — SIGNIFICANT CHANGE UP (ref 34.5–45)
HGB BLD-MCNC: 13 G/DL — SIGNIFICANT CHANGE UP (ref 11.5–15.5)
IANC: 7.9 K/UL — HIGH (ref 1.8–7.4)
IMM GRANULOCYTES NFR BLD AUTO: 0.4 % — SIGNIFICANT CHANGE UP (ref 0–0.9)
LYMPHOCYTES # BLD AUTO: 27.7 % — SIGNIFICANT CHANGE UP (ref 13–44)
LYMPHOCYTES # BLD AUTO: 3.46 K/UL — HIGH (ref 1–3.3)
MCHC RBC-ENTMCNC: 29.8 PG — SIGNIFICANT CHANGE UP (ref 27–34)
MCHC RBC-ENTMCNC: 34.2 GM/DL — SIGNIFICANT CHANGE UP (ref 32–36)
MCV RBC AUTO: 87.2 FL — SIGNIFICANT CHANGE UP (ref 80–100)
MONOCYTES # BLD AUTO: 0.97 K/UL — HIGH (ref 0–0.9)
MONOCYTES NFR BLD AUTO: 7.8 % — SIGNIFICANT CHANGE UP (ref 2–14)
NEUTROPHILS # BLD AUTO: 7.9 K/UL — HIGH (ref 1.8–7.4)
NEUTROPHILS NFR BLD AUTO: 63.3 % — SIGNIFICANT CHANGE UP (ref 43–77)
NRBC # BLD: 0 /100 WBCS — SIGNIFICANT CHANGE UP (ref 0–0)
NRBC # FLD: 0 K/UL — SIGNIFICANT CHANGE UP (ref 0–0)
PLATELET # BLD AUTO: 216 K/UL — SIGNIFICANT CHANGE UP (ref 150–400)
RBC # BLD: 4.36 M/UL — SIGNIFICANT CHANGE UP (ref 3.8–5.2)
RBC # FLD: 13.2 % — SIGNIFICANT CHANGE UP (ref 10.3–14.5)
WBC # BLD: 12.49 K/UL — HIGH (ref 3.8–10.5)
WBC # FLD AUTO: 12.49 K/UL — HIGH (ref 3.8–10.5)

## 2022-10-14 RX ORDER — LANOLIN
1 OINTMENT (GRAM) TOPICAL
Qty: 0 | Refills: 0 | DISCHARGE
Start: 2022-10-14

## 2022-10-14 RX ORDER — ACETAMINOPHEN 500 MG
3 TABLET ORAL
Qty: 0 | Refills: 0 | DISCHARGE
Start: 2022-10-14

## 2022-10-14 RX ADMIN — SODIUM CHLORIDE 3 MILLILITER(S): 9 INJECTION INTRAMUSCULAR; INTRAVENOUS; SUBCUTANEOUS at 21:17

## 2022-10-14 RX ADMIN — Medication 600 MILLIGRAM(S): at 12:06

## 2022-10-14 RX ADMIN — Medication 600 MILLIGRAM(S): at 12:50

## 2022-10-14 RX ADMIN — SODIUM CHLORIDE 3 MILLILITER(S): 9 INJECTION INTRAMUSCULAR; INTRAVENOUS; SUBCUTANEOUS at 06:33

## 2022-10-14 RX ADMIN — Medication 600 MILLIGRAM(S): at 06:55

## 2022-10-14 RX ADMIN — SODIUM CHLORIDE 3 MILLILITER(S): 9 INJECTION INTRAMUSCULAR; INTRAVENOUS; SUBCUTANEOUS at 15:11

## 2022-10-14 RX ADMIN — Medication 600 MILLIGRAM(S): at 05:55

## 2022-10-14 RX ADMIN — Medication 1 TABLET(S): at 12:06

## 2022-10-14 RX ADMIN — Medication 600 MILLIGRAM(S): at 00:05

## 2022-10-14 RX ADMIN — Medication 975 MILLIGRAM(S): at 20:01

## 2022-10-14 RX ADMIN — INFLUENZA VIRUS VACCINE 0.5 MILLILITER(S): 15; 15; 15; 15 SUSPENSION INTRAMUSCULAR at 05:54

## 2022-10-14 RX ADMIN — Medication 975 MILLIGRAM(S): at 21:01

## 2022-10-15 VITALS
RESPIRATION RATE: 18 BRPM | DIASTOLIC BLOOD PRESSURE: 70 MMHG | TEMPERATURE: 98 F | SYSTOLIC BLOOD PRESSURE: 131 MMHG | HEART RATE: 100 BPM | OXYGEN SATURATION: 100 %

## 2022-10-15 RX ADMIN — Medication 30 MILLIGRAM(S): at 00:10

## 2022-10-15 RX ADMIN — Medication 600 MILLIGRAM(S): at 00:10

## 2022-10-15 RX ADMIN — Medication 600 MILLIGRAM(S): at 05:58

## 2022-10-15 RX ADMIN — Medication 600 MILLIGRAM(S): at 01:10

## 2022-10-15 RX ADMIN — Medication 600 MILLIGRAM(S): at 06:58

## 2022-10-15 RX ADMIN — SODIUM CHLORIDE 3 MILLILITER(S): 9 INJECTION INTRAMUSCULAR; INTRAVENOUS; SUBCUTANEOUS at 06:50

## 2022-10-15 NOTE — PROGRESS NOTE ADULT - SUBJECTIVE AND OBJECTIVE BOX
OB Progress Note:  PPD#2    S: 31yo  PPD#2 s/p , . Course c/b gHTN. Patient feels well. Pain is well controlled. She is tolerating a regular diet and passing flatus. She is voiding spontaneously, and ambulating without difficulty. Denies headache, vision changes, lightheadedness, CP, SOB, RUQ pain, epigastric pain, N/V.     O:  Vital Signs Last 24 Hrs  T(C): 36.7 (15 Oct 2022 06:00), Max: 37.1 (14 Oct 2022 14:41)  T(F): 98 (15 Oct 2022 06:00), Max: 98.8 (14 Oct 2022 17:31)  HR: 86 (15 Oct 2022 06:00) (75 - 100)  BP: 126/73 (15 Oct 2022 06:00) (113/76 - 137/76)  RR: 18 (15 Oct 2022 06:00) (17 - 19)  SpO2: 98% (15 Oct 2022 06:00) (98% - 100%)    Parameters below as of 15 Oct 2022 06:00  Patient On (Oxygen Delivery Method): room air    MEDICATIONS  (STANDING):  acetaminophen     Tablet .. 975 milliGRAM(s) Oral <User Schedule>  diphtheria/tetanus/pertussis (acellular) Vaccine (ADAcel) 0.5 milliLiter(s) IntraMuscular once  ibuprofen  Tablet. 600 milliGRAM(s) Oral every 6 hours  NIFEdipine XL 30 milliGRAM(s) Oral daily  oxytocin Infusion 333.333 milliUNIT(s)/Min (1000 mL/Hr) IV Continuous <Continuous>  prenatal multivitamin 1 Tablet(s) Oral daily  sodium chloride 0.9% lock flush 3 milliLiter(s) IV Push every 8 hours    Labs:  Blood type: O Positive  Rubella IgG: RPR: Negative                          13.0   12.49<H> >-----------< 216    ( 10-14 @ 08:14 )             38.0                        14.4   10.02 >-----------< 237    ( 10-12 @ 19:49 )             43.2    10-12-22 @ 19:49      137  |  102  |  7   ----------------------------<  77  3.5   |  20<L>  |  0.46<L>      Ca    9.8      12 Oct 2022 19:49    TPro  6.7  /  Alb  3.5  /  TBili  0.2  /  DBili  x   /  AST  17  /  ALT  15  /  AlkPhos  136<H>  10-12-22 @ 19:49    Physical Exam:  General: NAD  Abdomen: soft, non-tender, non-distended, fundus firm  Vaginal: Lochia wnl  Extremities: No erythema/edema
Post-partum Note,   She is a  32y woman who is now post-partum day: 1    Subjective:  The patient feels well.  She is ambulating.   She is tolerating regular diet.  She denies nausea and vomiting; denies fever.  She is voiding.  Her pain is controlled.  She reports normal postpartum bleeding.  She is breastfeeding.  She denies HA, blurry vision, epigastric pain, SOB, CP, dizziness, lightheadedness    Physical exam:    Vital Signs Last 24 Hrs  T(C): 36.9 (14 Oct 2022 09:53), Max: 37.1 (13 Oct 2022 13:30)  T(F): 98.4 (14 Oct 2022 09:53), Max: 98.78 (13 Oct 2022 13:30)  HR: 100 (14 Oct 2022 09:53) (79 - 172)  BP: 137/76 (14 Oct 2022 09:53) (95/55 - 155/72)  BP(mean): --  RR: 19 (14 Oct 2022 09:53) (16 - 19)  SpO2: 99% (14 Oct 2022 09:53) (68% - 100%)    Parameters below as of 14 Oct 2022 09:53  Patient On (Oxygen Delivery Method): room air        Gen: NAD  Breast: Soft, nontender, not engorged.  Abdomen: Soft, nontender, no distension , firm uterine fundus at umbilicus.  Pelvic: Normal lochia noted  Ext: No calf tenderness    LABS:                        13.0   12.49 )-----------( 216      ( 14 Oct 2022 08:14 )             38.0         Allergies    No Known Allergies      MEDICATIONS  (STANDING):  acetaminophen     Tablet .. 975 milliGRAM(s) Oral <User Schedule>  diphtheria/tetanus/pertussis (acellular) Vaccine (ADAcel) 0.5 milliLiter(s) IntraMuscular once  ibuprofen  Tablet. 600 milliGRAM(s) Oral every 6 hours  NIFEdipine XL 30 milliGRAM(s) Oral daily  oxytocin Infusion 333.333 milliUNIT(s)/Min (1000 mL/Hr) IV Continuous <Continuous>  prenatal multivitamin 1 Tablet(s) Oral daily  sodium chloride 0.9% lock flush 3 milliLiter(s) IV Push every 8 hours    MEDICATIONS  (PRN):  benzocaine 20%/menthol 0.5% Spray 1 Spray(s) Topical every 6 hours PRN for Perineal discomfort  dibucaine 1% Ointment 1 Application(s) Topical every 6 hours PRN Perineal discomfort  diphenhydrAMINE 25 milliGRAM(s) Oral every 6 hours PRN Pruritus  hydrocortisone 1% Cream 1 Application(s) Topical every 6 hours PRN Moderate Pain (4-6)  lanolin Ointment 1 Application(s) Topical every 6 hours PRN nipple soreness  magnesium hydroxide Suspension 30 milliLiter(s) Oral two times a day PRN Constipation  oxyCODONE    IR 5 milliGRAM(s) Oral every 3 hours PRN Moderate to Severe Pain (4-10)  oxyCODONE    IR 5 milliGRAM(s) Oral once PRN Moderate to Severe Pain (4-10)  pramoxine 1%/zinc 5% Cream 1 Application(s) Topical every 4 hours PRN Moderate Pain (4-6)  simethicone 80 milliGRAM(s) Chew every 4 hours PRN Gas  witch hazel Pads 1 Application(s) Topical every 4 hours PRN Perineal discomfort            
OB Progress Note:  PPD#1    S: 33yo  PPD#1 s/p , . Course c/b gHTN. Patient feels well. Pain is well controlled. She is tolerating a regular diet and passing flatus. She is voiding spontaneously, and ambulating without difficulty. Denies headache, vision changes, lightheadedness, CP, SOB, RUQ pain, epigastric pain, N/V.     O:  Vitals:  Vital Signs Last 24 Hrs  T(C): 37.1 (14 Oct 2022 06:00), Max: 37.1 (13 Oct 2022 13:30)  T(F): 98.7 (14 Oct 2022 06:00), Max: 98.78 (13 Oct 2022 13:30)  HR: 91 (14 Oct 2022 06:00) (79 - 172)  BP: 128/74 (14 Oct 2022 06:00) (95/55 - 155/72)  RR: 18 (14 Oct 2022 06:00) (16 - 19)  SpO2: 100% (14 Oct 2022 06:00) (68% - 100%)    MEDICATIONS  (STANDING):  acetaminophen     Tablet .. 975 milliGRAM(s) Oral <User Schedule>  diphtheria/tetanus/pertussis (acellular) Vaccine (ADAcel) 0.5 milliLiter(s) IntraMuscular once  ibuprofen  Tablet. 600 milliGRAM(s) Oral every 6 hours  NIFEdipine XL 30 milliGRAM(s) Oral daily  oxytocin Infusion 333.333 milliUNIT(s)/Min (1000 mL/Hr) IV Continuous <Continuous>  prenatal multivitamin 1 Tablet(s) Oral daily  sodium chloride 0.9% lock flush 3 milliLiter(s) IV Push every 8 hours      Labs:  Blood type: O Positive  Rubella IgG: RPR: Negative                          13.0   12.49<H> >-----------< 216    ( 10-14 @ 08:14 )             38.0                        14.4   10.02 >-----------< 237    ( 10-12 @ 19:49 )             43.2    10-12-22 @ 19:49      137  |  102  |  7   ----------------------------<  77  3.5   |  20<L>  |  0.46<L>        Ca    9.8      12 Oct 2022 19:49    TPro  6.7  /  Alb  3.5  /  TBili  0.2  /  DBili  x   /  AST  17  /  ALT  15  /  AlkPhos  136<H>  10-12-22 @ 19:49    Physical Exam:  General: NAD  Abdomen: soft, non-tender, non-distended, fundus firm  Vaginal: Lochia wnl  Extremities: No erythema/edema

## 2022-10-15 NOTE — PROGRESS NOTE ADULT - ATTENDING COMMENTS
Patient seen at bedside   Doing well   BPs well controlled   continue procardia 30xl   DC home today   f/u in office this week

## 2022-10-15 NOTE — PROGRESS NOTE ADULT - ASSESSMENT
A/P: 33yo PPD#1 s/p , . Course c/b gHTN. Patient is stable and doing well post-partum.     #gHTN  - HELLP wnl, P/C: 0.2  - Overnight BPs: 120s-130s/60s-70s  - Continue Procardia 30XL daily  - Will continue to monitor BPs closley  - Patient aware of diagnosis and the need for BP monitoring after discharge to home    #PP care  - Pain well controlled, continue current pain regimen  - Increase ambulation, SCDs when not ambulating  - Continue regular diet    Ailin Herrera MD PGY1
A/P: 33yo PPD#2 s/p , . Course c/b gHTN. Patient is stable and doing well post-partum.     #gHTN  - HELLP wnl, P/C: 0.2  - Overnight BPs: 110s-120s/70s  - Continue Procardia 30XL daily  - Will continue to monitor BPs closley  - Patient aware of diagnosis and the need for BP monitoring after discharge to home    #PP care  - Pain well controlled, continue current pain regimen  - Increase ambulation, SCDs when not ambulating  - Continue regular diet  - Discharge planning per private attending    Ailin Herrera MD PGY1
Assessment and Plan  PPD #1 s/p   Doing well and good support system  GHTN  ·	continue procardia as prescribed- Rx sent to at home pharmacy through [facility]  ·	pt already has bp cuff at home- pto check bp's TID, keep logs and parameters reviewed  ·	VSS  ·	PEC precautions reviewed  Encourage ambulation.  PP & PPD Instructions reviewed.  CPC.  D/C home tomorrow.

## 2022-11-08 NOTE — OB PROVIDER H&P - CLICK TO LAUNCH ORM
. Mirvaso Pregnancy And Lactation Text: This medication has not been assigned a Pregnancy Risk Category. It is unknown if the medication is excreted in breast milk.

## 2022-11-15 LAB — SURGICAL PATHOLOGY STUDY: SIGNIFICANT CHANGE UP

## 2023-04-04 NOTE — DISCHARGE NOTE OB - NS OB DC IMMUNIZATIONS MMR YN
Detail Level: Zone Continue Regimen: Gentle skin care\\nD/C scratching \\nTAC Oint bid prn flare Render In Strict Bullet Format?: No No

## 2023-08-23 ENCOUNTER — APPOINTMENT (OUTPATIENT)
Dept: FAMILY MEDICINE | Facility: CLINIC | Age: 33
End: 2023-08-23

## 2023-09-05 ENCOUNTER — APPOINTMENT (OUTPATIENT)
Dept: ANTEPARTUM | Facility: CLINIC | Age: 33
End: 2023-09-05
Payer: COMMERCIAL

## 2023-09-05 ENCOUNTER — ASOB RESULT (OUTPATIENT)
Age: 33
End: 2023-09-05

## 2023-09-05 ENCOUNTER — LABORATORY RESULT (OUTPATIENT)
Age: 33
End: 2023-09-05

## 2023-09-05 PROCEDURE — 76813 OB US NUCHAL MEAS 1 GEST: CPT

## 2023-09-05 PROCEDURE — 76801 OB US < 14 WKS SINGLE FETUS: CPT | Mod: 59

## 2023-10-31 ENCOUNTER — APPOINTMENT (OUTPATIENT)
Dept: ANTEPARTUM | Facility: CLINIC | Age: 33
End: 2023-10-31
Payer: COMMERCIAL

## 2023-10-31 ENCOUNTER — ASOB RESULT (OUTPATIENT)
Age: 33
End: 2023-10-31

## 2023-10-31 PROCEDURE — 76811 OB US DETAILED SNGL FETUS: CPT

## 2023-12-05 ENCOUNTER — OUTPATIENT (OUTPATIENT)
Dept: INPATIENT UNIT | Facility: HOSPITAL | Age: 33
LOS: 1 days | Discharge: HOME | End: 2023-12-05
Payer: COMMERCIAL

## 2023-12-05 VITALS
TEMPERATURE: 98 F | HEART RATE: 98 BPM | DIASTOLIC BLOOD PRESSURE: 81 MMHG | RESPIRATION RATE: 16 BRPM | SYSTOLIC BLOOD PRESSURE: 147 MMHG

## 2023-12-05 VITALS — DIASTOLIC BLOOD PRESSURE: 63 MMHG | HEART RATE: 88 BPM | SYSTOLIC BLOOD PRESSURE: 111 MMHG

## 2023-12-05 DIAGNOSIS — O26.899 OTHER SPECIFIED PREGNANCY RELATED CONDITIONS, UNSPECIFIED TRIMESTER: ICD-10-CM

## 2023-12-05 LAB
ALBUMIN SERPL ELPH-MCNC: 3.6 G/DL — SIGNIFICANT CHANGE UP (ref 3.3–5)
ALBUMIN SERPL ELPH-MCNC: 3.6 G/DL — SIGNIFICANT CHANGE UP (ref 3.3–5)
ALP SERPL-CCNC: 56 U/L — SIGNIFICANT CHANGE UP (ref 40–120)
ALP SERPL-CCNC: 56 U/L — SIGNIFICANT CHANGE UP (ref 40–120)
ALT FLD-CCNC: 9 U/L — SIGNIFICANT CHANGE UP (ref 4–33)
ALT FLD-CCNC: 9 U/L — SIGNIFICANT CHANGE UP (ref 4–33)
ANION GAP SERPL CALC-SCNC: 10 MMOL/L — SIGNIFICANT CHANGE UP (ref 7–14)
ANION GAP SERPL CALC-SCNC: 10 MMOL/L — SIGNIFICANT CHANGE UP (ref 7–14)
APPEARANCE UR: CLEAR — SIGNIFICANT CHANGE UP
APPEARANCE UR: CLEAR — SIGNIFICANT CHANGE UP
APTT BLD: 31.1 SEC — SIGNIFICANT CHANGE UP (ref 24.5–35.6)
APTT BLD: 31.1 SEC — SIGNIFICANT CHANGE UP (ref 24.5–35.6)
AST SERPL-CCNC: 14 U/L — SIGNIFICANT CHANGE UP (ref 4–32)
AST SERPL-CCNC: 14 U/L — SIGNIFICANT CHANGE UP (ref 4–32)
BASOPHILS # BLD AUTO: 0.02 K/UL — SIGNIFICANT CHANGE UP (ref 0–0.2)
BASOPHILS # BLD AUTO: 0.02 K/UL — SIGNIFICANT CHANGE UP (ref 0–0.2)
BASOPHILS NFR BLD AUTO: 0.2 % — SIGNIFICANT CHANGE UP (ref 0–2)
BASOPHILS NFR BLD AUTO: 0.2 % — SIGNIFICANT CHANGE UP (ref 0–2)
BILIRUB SERPL-MCNC: <0.2 MG/DL — SIGNIFICANT CHANGE UP (ref 0.2–1.2)
BILIRUB SERPL-MCNC: <0.2 MG/DL — SIGNIFICANT CHANGE UP (ref 0.2–1.2)
BILIRUB UR-MCNC: NEGATIVE — SIGNIFICANT CHANGE UP
BILIRUB UR-MCNC: NEGATIVE — SIGNIFICANT CHANGE UP
BUN SERPL-MCNC: 8 MG/DL — SIGNIFICANT CHANGE UP (ref 7–23)
BUN SERPL-MCNC: 8 MG/DL — SIGNIFICANT CHANGE UP (ref 7–23)
CALCIUM SERPL-MCNC: 9.3 MG/DL — SIGNIFICANT CHANGE UP (ref 8.4–10.5)
CALCIUM SERPL-MCNC: 9.3 MG/DL — SIGNIFICANT CHANGE UP (ref 8.4–10.5)
CHLORIDE SERPL-SCNC: 102 MMOL/L — SIGNIFICANT CHANGE UP (ref 98–107)
CHLORIDE SERPL-SCNC: 102 MMOL/L — SIGNIFICANT CHANGE UP (ref 98–107)
CO2 SERPL-SCNC: 25 MMOL/L — SIGNIFICANT CHANGE UP (ref 22–31)
CO2 SERPL-SCNC: 25 MMOL/L — SIGNIFICANT CHANGE UP (ref 22–31)
COLOR SPEC: YELLOW — SIGNIFICANT CHANGE UP
COLOR SPEC: YELLOW — SIGNIFICANT CHANGE UP
CREAT ?TM UR-MCNC: 30 MG/DL — SIGNIFICANT CHANGE UP
CREAT ?TM UR-MCNC: 30 MG/DL — SIGNIFICANT CHANGE UP
CREAT SERPL-MCNC: 0.56 MG/DL — SIGNIFICANT CHANGE UP (ref 0.5–1.3)
CREAT SERPL-MCNC: 0.56 MG/DL — SIGNIFICANT CHANGE UP (ref 0.5–1.3)
DIFF PNL FLD: NEGATIVE — SIGNIFICANT CHANGE UP
DIFF PNL FLD: NEGATIVE — SIGNIFICANT CHANGE UP
EGFR: 124 ML/MIN/1.73M2 — SIGNIFICANT CHANGE UP
EGFR: 124 ML/MIN/1.73M2 — SIGNIFICANT CHANGE UP
EOSINOPHIL # BLD AUTO: 0.1 K/UL — SIGNIFICANT CHANGE UP (ref 0–0.5)
EOSINOPHIL # BLD AUTO: 0.1 K/UL — SIGNIFICANT CHANGE UP (ref 0–0.5)
EOSINOPHIL NFR BLD AUTO: 0.9 % — SIGNIFICANT CHANGE UP (ref 0–6)
EOSINOPHIL NFR BLD AUTO: 0.9 % — SIGNIFICANT CHANGE UP (ref 0–6)
FIBRINOGEN PPP-MCNC: 572 MG/DL — HIGH (ref 200–465)
FIBRINOGEN PPP-MCNC: 572 MG/DL — HIGH (ref 200–465)
GLUCOSE SERPL-MCNC: 90 MG/DL — SIGNIFICANT CHANGE UP (ref 70–99)
GLUCOSE SERPL-MCNC: 90 MG/DL — SIGNIFICANT CHANGE UP (ref 70–99)
GLUCOSE UR QL: NEGATIVE MG/DL — SIGNIFICANT CHANGE UP
GLUCOSE UR QL: NEGATIVE MG/DL — SIGNIFICANT CHANGE UP
HCT VFR BLD CALC: 38.1 % — SIGNIFICANT CHANGE UP (ref 34.5–45)
HCT VFR BLD CALC: 38.1 % — SIGNIFICANT CHANGE UP (ref 34.5–45)
HGB BLD-MCNC: 12.6 G/DL — SIGNIFICANT CHANGE UP (ref 11.5–15.5)
HGB BLD-MCNC: 12.6 G/DL — SIGNIFICANT CHANGE UP (ref 11.5–15.5)
IANC: 7.09 K/UL — SIGNIFICANT CHANGE UP (ref 1.8–7.4)
IANC: 7.09 K/UL — SIGNIFICANT CHANGE UP (ref 1.8–7.4)
IMM GRANULOCYTES NFR BLD AUTO: 0.9 % — SIGNIFICANT CHANGE UP (ref 0–0.9)
IMM GRANULOCYTES NFR BLD AUTO: 0.9 % — SIGNIFICANT CHANGE UP (ref 0–0.9)
INR BLD: 0.98 RATIO — SIGNIFICANT CHANGE UP (ref 0.85–1.18)
INR BLD: 0.98 RATIO — SIGNIFICANT CHANGE UP (ref 0.85–1.18)
KETONES UR-MCNC: NEGATIVE MG/DL — SIGNIFICANT CHANGE UP
KETONES UR-MCNC: NEGATIVE MG/DL — SIGNIFICANT CHANGE UP
LDH SERPL L TO P-CCNC: 132 U/L — LOW (ref 135–225)
LDH SERPL L TO P-CCNC: 132 U/L — LOW (ref 135–225)
LEUKOCYTE ESTERASE UR-ACNC: NEGATIVE — SIGNIFICANT CHANGE UP
LEUKOCYTE ESTERASE UR-ACNC: NEGATIVE — SIGNIFICANT CHANGE UP
LYMPHOCYTES # BLD AUTO: 2.71 K/UL — SIGNIFICANT CHANGE UP (ref 1–3.3)
LYMPHOCYTES # BLD AUTO: 2.71 K/UL — SIGNIFICANT CHANGE UP (ref 1–3.3)
LYMPHOCYTES # BLD AUTO: 25.2 % — SIGNIFICANT CHANGE UP (ref 13–44)
LYMPHOCYTES # BLD AUTO: 25.2 % — SIGNIFICANT CHANGE UP (ref 13–44)
MCHC RBC-ENTMCNC: 29.6 PG — SIGNIFICANT CHANGE UP (ref 27–34)
MCHC RBC-ENTMCNC: 29.6 PG — SIGNIFICANT CHANGE UP (ref 27–34)
MCHC RBC-ENTMCNC: 33.1 GM/DL — SIGNIFICANT CHANGE UP (ref 32–36)
MCHC RBC-ENTMCNC: 33.1 GM/DL — SIGNIFICANT CHANGE UP (ref 32–36)
MCV RBC AUTO: 89.6 FL — SIGNIFICANT CHANGE UP (ref 80–100)
MCV RBC AUTO: 89.6 FL — SIGNIFICANT CHANGE UP (ref 80–100)
MONOCYTES # BLD AUTO: 0.72 K/UL — SIGNIFICANT CHANGE UP (ref 0–0.9)
MONOCYTES # BLD AUTO: 0.72 K/UL — SIGNIFICANT CHANGE UP (ref 0–0.9)
MONOCYTES NFR BLD AUTO: 6.7 % — SIGNIFICANT CHANGE UP (ref 2–14)
MONOCYTES NFR BLD AUTO: 6.7 % — SIGNIFICANT CHANGE UP (ref 2–14)
NEUTROPHILS # BLD AUTO: 7.09 K/UL — SIGNIFICANT CHANGE UP (ref 1.8–7.4)
NEUTROPHILS # BLD AUTO: 7.09 K/UL — SIGNIFICANT CHANGE UP (ref 1.8–7.4)
NEUTROPHILS NFR BLD AUTO: 66.1 % — SIGNIFICANT CHANGE UP (ref 43–77)
NEUTROPHILS NFR BLD AUTO: 66.1 % — SIGNIFICANT CHANGE UP (ref 43–77)
NITRITE UR-MCNC: NEGATIVE — SIGNIFICANT CHANGE UP
NITRITE UR-MCNC: NEGATIVE — SIGNIFICANT CHANGE UP
NRBC # BLD: 0 /100 WBCS — SIGNIFICANT CHANGE UP (ref 0–0)
NRBC # BLD: 0 /100 WBCS — SIGNIFICANT CHANGE UP (ref 0–0)
NRBC # FLD: 0 K/UL — SIGNIFICANT CHANGE UP (ref 0–0)
NRBC # FLD: 0 K/UL — SIGNIFICANT CHANGE UP (ref 0–0)
PH UR: 6.5 — SIGNIFICANT CHANGE UP (ref 5–8)
PH UR: 6.5 — SIGNIFICANT CHANGE UP (ref 5–8)
PLATELET # BLD AUTO: 274 K/UL — SIGNIFICANT CHANGE UP (ref 150–400)
PLATELET # BLD AUTO: 274 K/UL — SIGNIFICANT CHANGE UP (ref 150–400)
POTASSIUM SERPL-MCNC: 4.6 MMOL/L — SIGNIFICANT CHANGE UP (ref 3.5–5.3)
POTASSIUM SERPL-MCNC: 4.6 MMOL/L — SIGNIFICANT CHANGE UP (ref 3.5–5.3)
POTASSIUM SERPL-SCNC: 4.6 MMOL/L — SIGNIFICANT CHANGE UP (ref 3.5–5.3)
POTASSIUM SERPL-SCNC: 4.6 MMOL/L — SIGNIFICANT CHANGE UP (ref 3.5–5.3)
PROT ?TM UR-MCNC: 5 MG/DL — SIGNIFICANT CHANGE UP
PROT ?TM UR-MCNC: 5 MG/DL — SIGNIFICANT CHANGE UP
PROT SERPL-MCNC: 7 G/DL — SIGNIFICANT CHANGE UP (ref 6–8.3)
PROT SERPL-MCNC: 7 G/DL — SIGNIFICANT CHANGE UP (ref 6–8.3)
PROT UR-MCNC: NEGATIVE MG/DL — SIGNIFICANT CHANGE UP
PROT UR-MCNC: NEGATIVE MG/DL — SIGNIFICANT CHANGE UP
PROT/CREAT UR-RTO: 0.2 RATIO — SIGNIFICANT CHANGE UP (ref 0–0.2)
PROT/CREAT UR-RTO: 0.2 RATIO — SIGNIFICANT CHANGE UP (ref 0–0.2)
PROTHROM AB SERPL-ACNC: 11 SEC — SIGNIFICANT CHANGE UP (ref 9.5–13)
PROTHROM AB SERPL-ACNC: 11 SEC — SIGNIFICANT CHANGE UP (ref 9.5–13)
RBC # BLD: 4.25 M/UL — SIGNIFICANT CHANGE UP (ref 3.8–5.2)
RBC # BLD: 4.25 M/UL — SIGNIFICANT CHANGE UP (ref 3.8–5.2)
RBC # FLD: 13.1 % — SIGNIFICANT CHANGE UP (ref 10.3–14.5)
RBC # FLD: 13.1 % — SIGNIFICANT CHANGE UP (ref 10.3–14.5)
SODIUM SERPL-SCNC: 137 MMOL/L — SIGNIFICANT CHANGE UP (ref 135–145)
SODIUM SERPL-SCNC: 137 MMOL/L — SIGNIFICANT CHANGE UP (ref 135–145)
SP GR SPEC: 1.01 — SIGNIFICANT CHANGE UP (ref 1–1.03)
SP GR SPEC: 1.01 — SIGNIFICANT CHANGE UP (ref 1–1.03)
URATE SERPL-MCNC: 3.1 MG/DL — SIGNIFICANT CHANGE UP (ref 2.5–7)
URATE SERPL-MCNC: 3.1 MG/DL — SIGNIFICANT CHANGE UP (ref 2.5–7)
UROBILINOGEN FLD QL: 0.2 MG/DL — SIGNIFICANT CHANGE UP (ref 0.2–1)
UROBILINOGEN FLD QL: 0.2 MG/DL — SIGNIFICANT CHANGE UP (ref 0.2–1)
WBC # BLD: 10.74 K/UL — HIGH (ref 3.8–10.5)
WBC # BLD: 10.74 K/UL — HIGH (ref 3.8–10.5)
WBC # FLD AUTO: 10.74 K/UL — HIGH (ref 3.8–10.5)
WBC # FLD AUTO: 10.74 K/UL — HIGH (ref 3.8–10.5)

## 2023-12-05 PROCEDURE — 99212 OFFICE O/P EST SF 10 MIN: CPT | Mod: 25

## 2023-12-05 PROCEDURE — 59025 FETAL NON-STRESS TEST: CPT | Mod: 26

## 2023-12-05 NOTE — OB RN TRIAGE NOTE - CHIEF COMPLAINT QUOTE
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

## 2023-12-05 NOTE — OB PROVIDER TRIAGE NOTE - NSHPPHYSICALEXAM_GEN_ALL_CORE
Vital Signs Last 24 Hrs  T(C): 36.8 (05 Dec 2023 14:17), Max: 36.8 (05 Dec 2023 14:17)  T(F): 98.2 (05 Dec 2023 14:17), Max: 98.2 (05 Dec 2023 14:17)  HR: 84 (05 Dec 2023 20:15) (84 - 98)  BP: 124/65 (05 Dec 2023 20:15) (124/65 - 147/81)  BP(mean): --  RR: 16 (05 Dec 2023 14:17) (16 - 16)  SpO2: --    Gen: A/O x3  Abd: Gravid uterus, toco in place   TAS: vertex, anterior placenta,  bpm in m-mode, MVP 4.98, good fetal movement and tone, images saved in ASOB  FHR: 150 baseline, moderate variability, with accelerations, reactive  CTX: uterine irritability

## 2023-12-05 NOTE — OB PROVIDER TRIAGE NOTE - ADDITIONAL INSTRUCTIONS
34 y/o , EDC 3/19, GA 25 weeks, evidence of ghtn.   - elevated BP in emtala 13:57 147/81, 18:20- 140/67  - elevated BP in triage 19:45 146/73, other BP's wnl  - HELLP labs wnl  - Patient to be discharged home with follow up and return precautions  - Please call OB office tomorrow morning and schedule follow up visit for this week  - Please collect 24 hour urine and bring to office   - Please return for decreased / no fetal movement, vaginal bleeding similar to that of a period, leaking / gush of fluid, regular contractions occurring 4-5 minutes  for one hour or requiring pain medication   - Please follow up for elevated BP of 140/90, headache, dizziness, blurred vision, seeing spots in eyes, epigastric pain, pain under right breast, nausea, vomiting, leg swelling, or shortness of breath.  - Patient educated of plan and demonstrates understanding. All questions answered. Discharge instructions provided and signed.     Plan d/w Dr. Martinez

## 2023-12-05 NOTE — OB RN TRIAGE NOTE - PRETERM DELIVERIES, OB PROFILE
Quality 130: Documentation Of Current Medications In The Medical Record: Current Medications Documented Detail Level: Detailed Quality 47: Advance Care Plan: Advance Care Planning discussed and documented; advance care plan or surrogate decision maker documented in the medical record. Quality 111:Pneumonia Vaccination Status For Older Adults: Pneumococcal Vaccination not Administered or Previously Received, Reason not Otherwise Specified Quality 226: Preventive Care And Screening: Tobacco Use: Screening And Cessation Intervention: Patient screened for tobacco and never smoked Name And Contact Information For Health Care Proxy: Elizabeth reddy Quality 110: Preventive Care And Screening: Influenza Immunization: Influenza Immunization not Administered because Patient Refused. 0

## 2023-12-05 NOTE — OB RN TRIAGE NOTE - NS_TRIAGEADDITIONAL COMMENTS_OBGYN_ALL_OB_FT
1500-logistical huddle due to triage/Emtala acuity with Dr Loya, ABNER Trejo; pt discussed, awaiting available bed for evaluation

## 2023-12-05 NOTE — OB PROVIDER TRIAGE NOTE - NSOBPROVIDERNOTE_OBGYN_ALL_OB_FT
32 y/o , EDC 3/19, [place] 25 weeks  -elevated BP in emtala 13:57 147/81, 18:20- 140/67  -elevated BP in triage 19:45 146/73, other BP's wnl    A Wil, PAC 34 y/o , EDC 3/19, GA 25 weeks  -elevated BP in emtala 13:57 147/81, 18:20- 140/67  -elevated BP in triage 19:45 146/73, other BP's wnl    A Wil, PAC 34 y/o , EDC 3/19, GA 25 weeks, evidence of ghtn.   - elevated BP in emtala 13:57 147/81, 18:20- 140/67  - elevated BP in triage 19:45 146/73, other BP's wnl  - HELLP labs wnl  - Patient to be discharged home with follow up and return precautions  - Please call OB office tomorrow morning and schedule follow up visit for this week  - Please collect 24 hour urine and bring to office   - Please return for decreased / no fetal movement, vaginal bleeding similar to that of a period, leaking / gush of fluid, regular contractions occurring 4-5 minutes  for one hour or requiring pain medication   - Please follow up for elevated BP of 140/90, headache, dizziness, blurred vision, seeing spots in eyes, epigastric pain, pain under right breast, nausea, vomiting, leg swelling, or shortness of breath.  - Patient educated of plan and demonstrates understanding. All questions answered. Discharge instructions provided and signed.     Plan d/w Dr. Juan De La Torre, PAC 32 y/o , EDC 3/19, GA 25 weeks, evidence of ghtn.   - elevated BP in emtala 13:57 147/81, 18:20- 140/67  - elevated BP in triage 19:45 146/73, other BP's wnl  - HELLP labs wnl  - Patient to be discharged home with follow up and return precautions  - Please call OB office tomorrow morning and schedule follow up visit for this week  - Please collect 24 hour urine and bring to office   - Please return for decreased / no fetal movement, vaginal bleeding similar to that of a period, leaking / gush of fluid, regular contractions occurring 4-5 minutes  for one hour or requiring pain medication   - Please follow up for elevated BP of 140/90, headache, dizziness, blurred vision, seeing spots in eyes, epigastric pain, pain under right breast, nausea, vomiting, leg swelling, or shortness of breath.  - Patient educated of plan and demonstrates understanding. All questions answered. Discharge instructions provided and signed.     Plan d/w Dr. Juan De La Torre, PAC 34 y/o , EDC 3/19, GA 25 weeks, evidence of ghtn.   - elevated BP in emtala 13:57 147/81, 18:20- 140/67  - elevated BP in triage 19:45 146/73, other BP's wnl  - HELLP labs wnl  - Patient to be discharged home with follow up and return precautions  - Please call OB office tomorrow morning and schedule follow up visit for this week  - Please collect 24 hour urine and bring to office   - Please return for decreased / no fetal movement, vaginal bleeding similar to that of a period, leaking / gush of fluid, regular contractions occurring 4-5 minutes  for one hour or requiring pain medication   - Please follow up for elevated BP of 140/90, headache, dizziness, blurred vision, seeing spots in eyes, epigastric pain, pain under right breast, nausea, vomiting, leg swelling, or shortness of breath.  - Patient educated of plan and demonstrates understanding. All questions answered. Discharge instructions provided and signed.     Plan d/w Dr. Juan De La Torre, PAC    Discharge Time: 21:15 32 y/o , EDC 3/19, GA 25 weeks, evidence of ghtn.   - elevated BP in emtala 13:57 147/81, 18:20- 140/67  - elevated BP in triage 19:45 146/73, other BP's wnl  - HELLP labs wnl  - Patient to be discharged home with follow up and return precautions  - Please call OB office tomorrow morning and schedule follow up visit for this week  - Please collect 24 hour urine and bring to office   - Please return for decreased / no fetal movement, vaginal bleeding similar to that of a period, leaking / gush of fluid, regular contractions occurring 4-5 minutes  for one hour or requiring pain medication   - Please follow up for elevated BP of 140/90, headache, dizziness, blurred vision, seeing spots in eyes, epigastric pain, pain under right breast, nausea, vomiting, leg swelling, or shortness of breath.  - Patient educated of plan and demonstrates understanding. All questions answered. Discharge instructions provided and signed.     Plan d/w Dr. Juan De La Torre, PAC    Discharge Time: 21:15 32 y/o , EDC 3/19, GA 25 weeks, evidence of ghtn.   - elevated BP in emtala 13:57 147/81, 18:20- 140/67  - elevated BP in triage 19:45 146/73, other BP's wnl  - HELLP labs wnl  - Patient to be discharged home with follow up and return precautions  - Please call OB office tomorrow morning and schedule follow up visit for this week  - Please collect 24 hour urine and bring to office   - Please return for decreased / no fetal movement, vaginal bleeding similar to that of a period, leaking / gush of fluid, regular contractions occurring 4-5 minutes  for one hour or requiring pain medication   - Please follow up for elevated BP of 140/90, headache, dizziness, blurred vision, seeing spots in eyes, epigastric pain, pain under right breast, nausea, vomiting, leg swelling, or shortness of breath.  - Patient educated of plan and demonstrates understanding. All questions answered. Discharge instructions provided and signed.     Plan d/w Dr. Juan De La Torre, PAC    Discharge Time: 21:25

## 2023-12-05 NOTE — OB RN TRIAGE NOTE - FALL HARM RISK - UNIVERSAL INTERVENTIONS
Bed in lowest position, wheels locked, appropriate side rails in place/Call bell, personal items and telephone in reach/Instruct patient to call for assistance before getting out of bed or chair/Non-slip footwear when patient is out of bed/[place] to call system/Physically safe environment - no spills, clutter or unnecessary equipment/Purposeful Proactive Rounding/Room/bathroom lighting operational, light cord in reach Bed in lowest position, wheels locked, appropriate side rails in place/Call bell, personal items and telephone in reach/Instruct patient to call for assistance before getting out of bed or chair/Non-slip footwear when patient is out of bed/[place] to call system/Physically safe environment - no spills, clutter or unnecessary equipment/Purposeful Proactive Rounding/Room/bathroom lighting operational, light cord in reach

## 2023-12-05 NOTE — OB PROVIDER TRIAGE NOTE - HISTORY OF PRESENT ILLNESS
34 y/o , EDC 3/19, GA 25 weeks, presents for elevated BP today of 160/80. Pt reports she takes her BP's at home every day due to history of ghtn and thus far BP's have been normal, this morning BP was 134/79. 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 Pt reports she has palpitations sometimes in pregnancy when BP is elevated, reports this has not happened before during current pregnancy. Denies current palpitations, denies headache, dizziness, blurred vision, scotomata, RUQ pain, nausea, vomiting. Denies VB/ LOF/ contractions. Reports good FM.    PNC: WHP    AP Course: Uncomplicated per pt

## 2023-12-05 NOTE — OB PROVIDER TRIAGE NOTE - NSHPLABSRESULTS_GEN_ALL_CORE
12.6   10.74 )-----------( 274      ( 05 Dec 2023 19:56 )             38.1     Urinalysis Basic - ( 05 Dec 2023 19:56 )    Color: Yellow / Appearance: Clear / S.008 / pH: x  Gluc: x / Ketone: Negative mg/dL  / Bili: Negative / Urobili: 0.2 mg/dL   Blood: x / Protein: Negative mg/dL / Nitrite: Negative   Leuk Esterase: Negative / RBC: x / WBC x   Sq Epi: x / Non Sq Epi: x / Bacteria: x 12.6   10.74 )-----------( 274      ( 05 Dec 2023 19:56 )             38.1         137  |  102  |  8   ----------------------------<  90  4.6   |  25  |  0.56    Ca    9.3      05 Dec 2023 19:57    TPro  7.0  /  Alb  3.6  /  TBili  <0.2  /  DBili  x   /  AST  14  /  ALT  9   /  AlkPhos  56  12    Urinalysis Basic - ( 05 Dec 2023 19:56 )    Color: Yellow / Appearance: Clear / S.008 / pH: x  Gluc: x / Ketone: Negative mg/dL  / Bili: Negative / Urobili: 0.2 mg/dL   Blood: x / Protein: Negative mg/dL / Nitrite: Negative   Leuk Esterase: Negative / RBC: x / WBC x   Sq Epi: x / Non Sq Epi: x / Bacteria: x 12.6   10.74 )-----------( 274      ( 05 Dec 2023 19:56 )             38.1         137  |  102  |  8   ----------------------------<  90  4.6   |  25  |  0.56    Ca    9.3      05 Dec 2023 19:57    TPro  7.0  /  Alb  3.6  /  TBili  <0.2  /  DBili  x   /  AST  14  /  ALT  9   /  AlkPhos  56  12    Urinalysis Basic - ( 05 Dec 2023 19:56 )    Color: Yellow / Appearance: Clear / S.008 / pH: x  Gluc: x / Ketone: Negative mg/dL  / Bili: Negative / Urobili: 0.2 mg/dL   Blood: x / Protein: Negative mg/dL / Nitrite: Negative   Leuk Esterase: Negative / RBC: x / WBC x   Sq Epi: x / Non Sq Epi: x / Bacteria: x    PCR: .2

## 2023-12-07 DIAGNOSIS — O13.2 GESTATIONAL [PREGNANCY-INDUCED] HYPERTENSION WITHOUT SIGNIFICANT PROTEINURIA, SECOND TRIMESTER: ICD-10-CM

## 2023-12-07 DIAGNOSIS — O26.893 OTHER SPECIFIED PREGNANCY RELATED CONDITIONS, THIRD TRIMESTER: ICD-10-CM

## 2023-12-07 DIAGNOSIS — R00.2 PALPITATIONS: ICD-10-CM

## 2023-12-07 DIAGNOSIS — Z3A.25 25 WEEKS GESTATION OF PREGNANCY: ICD-10-CM

## 2023-12-12 DIAGNOSIS — Z82.49 FAMILY HISTORY OF ISCHEMIC HEART DISEASE AND OTHER DISEASES OF THE CIRCULATORY SYSTEM: ICD-10-CM

## 2023-12-12 DIAGNOSIS — Z78.9 OTHER SPECIFIED HEALTH STATUS: ICD-10-CM

## 2023-12-12 DIAGNOSIS — R03.0 ELEVATED BLOOD-PRESSURE READING, W/OUT DIAGNOSIS OF HYPERTENSION: ICD-10-CM

## 2023-12-12 RX ORDER — UBIDECARENONE/VIT E ACET 100MG-5
CAPSULE ORAL
Refills: 0 | Status: ACTIVE | COMMUNITY

## 2023-12-12 RX ORDER — ELASTIC BANDAGE 2"X2.2YD
BANDAGE TOPICAL DAILY
Refills: 0 | Status: ACTIVE | COMMUNITY

## 2023-12-14 ENCOUNTER — APPOINTMENT (OUTPATIENT)
Dept: CARDIOLOGY | Facility: CLINIC | Age: 33
End: 2023-12-14
Payer: COMMERCIAL

## 2023-12-14 ENCOUNTER — NON-APPOINTMENT (OUTPATIENT)
Age: 33
End: 2023-12-14

## 2023-12-14 VITALS
BODY MASS INDEX: 32.65 KG/M2 | WEIGHT: 196 LBS | HEIGHT: 65 IN | OXYGEN SATURATION: 100 % | SYSTOLIC BLOOD PRESSURE: 104 MMHG | HEART RATE: 92 BPM | DIASTOLIC BLOOD PRESSURE: 67 MMHG

## 2023-12-14 PROCEDURE — 99204 OFFICE O/P NEW MOD 45 MIN: CPT | Mod: 25

## 2023-12-14 PROCEDURE — 93000 ELECTROCARDIOGRAM COMPLETE: CPT

## 2023-12-14 NOTE — DISCUSSION/SUMMARY
[FreeTextEntry1] : 33 year old woman  currently 26.2 weeks pregnant  (DELPHINE 3/19/24) who comes in for evaluation GHTN She states she had no issues with first pregnancy, with second pregnancy- at the end-post partum had elevated BP but no meds and normalized quickly, 3rd pregnancy () delivered at 37  weeks- GHTN- put on Nifedipine 30 mg daily; took meds for about 1 month Between pregnancies normotensive Checks at home and had 2 spikes the weeks of  but otherwise really controlled On  mg daily FU 6-8 weeks -CHeck TTE [EKG obtained to assist in diagnosis and management of assessed problem(s)] : EKG obtained to assist in diagnosis and management of assessed problem(s)

## 2023-12-19 NOTE — OB PROVIDER TRIAGE NOTE - NS_CHIEFCOMPLAINT_OBGYN_ALL_OB
"My front office (robbin) knocked on my door while I was in room seeing a patient in another room, stating \"one of your patients is passing out in the lab and patient feeling very faint even after conservative measures\" advised her please call 911 now, and relayed message to lab personnel as well to call 911  "
Other

## 2023-12-29 ENCOUNTER — APPOINTMENT (OUTPATIENT)
Dept: MATERNAL FETAL MEDICINE | Facility: CLINIC | Age: 33
End: 2023-12-29
Payer: COMMERCIAL

## 2023-12-29 ENCOUNTER — ASOB RESULT (OUTPATIENT)
Age: 33
End: 2023-12-29

## 2023-12-29 PROCEDURE — 99203 OFFICE O/P NEW LOW 30 MIN: CPT | Mod: 95

## 2024-02-09 ENCOUNTER — APPOINTMENT (OUTPATIENT)
Dept: CARDIOLOGY | Facility: CLINIC | Age: 34
End: 2024-02-09
Payer: COMMERCIAL

## 2024-02-09 ENCOUNTER — RESULT REVIEW (OUTPATIENT)
Age: 34
End: 2024-02-09

## 2024-02-09 ENCOUNTER — NON-APPOINTMENT (OUTPATIENT)
Age: 34
End: 2024-02-09

## 2024-02-09 ENCOUNTER — OUTPATIENT (OUTPATIENT)
Dept: OUTPATIENT SERVICES | Facility: HOSPITAL | Age: 34
LOS: 1 days | End: 2024-02-09
Payer: COMMERCIAL

## 2024-02-09 ENCOUNTER — APPOINTMENT (OUTPATIENT)
Dept: CV DIAGNOSITCS | Facility: HOSPITAL | Age: 34
End: 2024-02-09

## 2024-02-09 VITALS
DIASTOLIC BLOOD PRESSURE: 73 MMHG | BODY MASS INDEX: 32.65 KG/M2 | HEART RATE: 93 BPM | SYSTOLIC BLOOD PRESSURE: 112 MMHG | HEIGHT: 65 IN | WEIGHT: 196 LBS | OXYGEN SATURATION: 98 %

## 2024-02-09 PROCEDURE — 99214 OFFICE O/P EST MOD 30 MIN: CPT | Mod: 25

## 2024-02-09 PROCEDURE — 93306 TTE W/DOPPLER COMPLETE: CPT | Mod: 26

## 2024-02-09 PROCEDURE — 93000 ELECTROCARDIOGRAM COMPLETE: CPT

## 2024-02-09 NOTE — HISTORY OF PRESENT ILLNESS
Pt advised that she will contact her psych provider mediation refill. CF   [FreeTextEntry1] : 33 year old woman  currently ~34 weeks pregnant  (DELPHINE 3/19/24) who comes in for evaluation GHTN She states she had no issues with first pregnancy, with second pregnancy- at the end-post partum had elevated BP but no meds and normalized quickly, 3rd pregnancy () delivered at 37  weeks- GHTN- put on Nifedipine 30 mg daily; took meds for about 1 month Between pregnancies normotensive Checks at home and had 2 spikes the weeks of  but otherwise really controlled On  mg daily TTE normal

## 2024-02-09 NOTE — DISCUSSION/SUMMARY
[EKG obtained to assist in diagnosis and management of assessed problem(s)] : EKG obtained to assist in diagnosis and management of assessed problem(s) [FreeTextEntry1] : 33 year old woman  currently 34 weeks pregnant  (DELPHINE 3/19/24) who comes in for evaluation GHTN She states she had no issues with first pregnancy, with second pregnancy- at the end-post partum had elevated BP but no meds and normalized quickly, 3rd pregnancy () delivered at 37  weeks- GHTN- put on Nifedipine 30 mg daily; took meds for about 1 month Between pregnancies normotensive On  mg daily

## 2024-02-17 ENCOUNTER — OUTPATIENT (OUTPATIENT)
Dept: OUTPATIENT SERVICES | Facility: HOSPITAL | Age: 34
LOS: 1 days | Discharge: HOME | End: 2024-02-17
Payer: COMMERCIAL

## 2024-02-17 ENCOUNTER — ASOB RESULT (OUTPATIENT)
Age: 34
End: 2024-02-17

## 2024-02-17 ENCOUNTER — APPOINTMENT (OUTPATIENT)
Dept: ANTEPARTUM | Facility: CLINIC | Age: 34
End: 2024-02-17
Payer: COMMERCIAL

## 2024-02-17 VITALS
DIASTOLIC BLOOD PRESSURE: 80 MMHG | TEMPERATURE: 98 F | SYSTOLIC BLOOD PRESSURE: 120 MMHG | RESPIRATION RATE: 16 BRPM | HEART RATE: 115 BPM

## 2024-02-17 VITALS — DIASTOLIC BLOOD PRESSURE: 62 MMHG | SYSTOLIC BLOOD PRESSURE: 103 MMHG | HEART RATE: 85 BPM

## 2024-02-17 DIAGNOSIS — O26.899 OTHER SPECIFIED PREGNANCY RELATED CONDITIONS, UNSPECIFIED TRIMESTER: ICD-10-CM

## 2024-02-17 PROCEDURE — 76819 FETAL BIOPHYS PROFIL W/O NST: CPT | Mod: 26

## 2024-02-17 PROCEDURE — 76815 OB US LIMITED FETUS(S): CPT | Mod: 26

## 2024-02-17 PROCEDURE — 99221 1ST HOSP IP/OBS SF/LOW 40: CPT | Mod: 25

## 2024-02-17 PROCEDURE — 59025 FETAL NON-STRESS TEST: CPT | Mod: 26

## 2024-02-17 NOTE — OB PROVIDER TRIAGE NOTE - NSHPPHYSICALEXAM_GEN_ALL_CORE
ICU Vital Signs Last 24 Hrs  T(C): 36.8 (17 Feb 2024 11:36), Max: 36.8 (17 Feb 2024 11:36)  T(F): 98.2 (17 Feb 2024 11:36), Max: 98.2 (17 Feb 2024 11:36)  HR: 115 (17 Feb 2024 11:53) (115 - 115)  BP: 120/80 (17 Feb 2024 11:53) (120/80 - 120/80)  BP(mean): --  ABP: --  ABP(mean): --  RR: 16 (17 Feb 2024 11:36) (16 - 16)  SpO2: --    Gen: A&O x 3; NAD    Neuro- symmetrical facial features with no slurring of words  Pulm- bretahing is unlabored  Abd exam- soft and nontender  Extremities- full range of motion x 4    NST is reactive with 145 baseline with accels and mod variability; no ctx's  Abd sono- Images and report in ASOB- ICU Vital Signs Last 24 Hrs  T(C): 36.8 (17 Feb 2024 11:36), Max: 36.8 (17 Feb 2024 11:36)  T(F): 98.2 (17 Feb 2024 11:36), Max: 98.2 (17 Feb 2024 11:36)  HR: 115 (17 Feb 2024 11:53) (115 - 115)  BP: 120/80 (17 Feb 2024 11:53) (120/80 - 120/80)  BP(mean): --  ABP: --  ABP(mean): --  RR: 16 (17 Feb 2024 11:36) (16 - 16)  SpO2: --    Gen: A&O x 3; NAD    Neuro- symmetrical facial features with no slurring of words  Pulm- bretahing is unlabored  Abd exam- soft and nontender  Extremities- full range of motion x 4    NST is reactive with 145 baseline with accels and mod variability; no ctx's {NST was reviewed with Dr Lopez}  Abd sono- Images and report in ASOB- vtx; anterior placenta; ANNA-20.60; 8/8 BPP

## 2024-02-17 NOTE — OB PROVIDER TRIAGE NOTE - HISTORY OF PRESENT ILLNESS
32yo female  @ 35.4 wks SLIUP with GHTN here from the office for prolonged monitoring due to fetal tachycardia. Pt reports GFM and denies VB/LOF/ctx's. Pt reports she had some [employer] coffee right before her appt.   Pt reports she has been diagnosed with GHTN besed on one elevated BP of 150/90 and had a normal 24h urine collection in December. Pt reports she submitted a second 24h urine collection to the office today. Pt denies HA's/ RUQ or epigastric pain/ visual changes.    PNC complicated by GHTN

## 2024-02-17 NOTE — OB RN TRIAGE NOTE - NS_TRIAGETIMEOF NOTIFICATION_OBGYN_ALL_OB_DT
INITIAL NEUROLOGY CONSULTATION    DATE OF VISIT: 10/19/2022  CLINIC LOCATION: [facility]  MRN: [id number]  PATIENT NAME: Bonny Raymundo  YOB: 1942    REASON FOR VISIT: No chief complaint on file.    HISTORY OF PRESENT ILLNESS:                                                    Ms. 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    Per patient's report, in June 2022 the patient woke up with right visual field defect affected her ability to walk.  She was seen in ophthalmology clinic and ordered to have MRI of the brain together with head and neck MRA along with neurology referral.    Brain MRI with and without contrast from 8/7/2022 demonstrated of zone of late subacute to early chronic infarct in the left posterior cerebral artery territory affecting the medial left occipital lobe and splenium of the corpus callosum, correlating with the provided history.  In addition, mild generalized atrophy and small vessel ischemic changes affecting left corona radiata, right thalamus, bilateral cerebellum and white matter were seen.  Head MRA was negative.  Neck MRI did not demonstrate any evidence for dissection or hemodynamically significant narrowing.  All images were personally reviewed and independently interpreted.    Most recent echocardiogram was done in December 2020 and was negative for intracardiac thrombus.  Most recent laboratory results from October 2020 to include creatinine of 1.19, glucose of 114, vitamin B12 1515, WBC of 2.1, hemoglobin of 6.6, hematocrit of 22.5, MCV of 109, and platelet count of 95.  Her LDL was last checked in April 2018 (119), hemoglobin A1C was not checked in the past.    At the present time, the patient reports ***.  She is on  2.5 mg of Eliquis twice daily.  She is not on statin.  She denies any additional focal neurological symptoms.    No additional useful information is available in Care Everywhere, which was reviewed.  PAST MEDICAL/SURGICAL HISTORY:                                                    I personally reviewed patient's past medical and surgical history with the patient at today's visit.  MEDICATIONS:                                                    I personally reviewed patient's medications and allergies with the patient at today's visit.  ALLERGIES:                                                      Allergies   Allergen Reactions     Cats      Dogs      Seasonal Allergies      EXAM:                                                    VITAL SIGNS:   There were no vitals taken for this visit.  Mini-Cog Assessment:       General: pt is in NAD, cooperative.  Skin: normal turgor, moist mucous membranes, no lesions/rashes noticed.  HEENT: ATNC, EOMI, PERRL, white sclera, normal conjunctiva, no nystagmus or ptosis. No carotid bruits bilaterally.  Respiratory: lung sounds clear to auscultation bilaterally, no crackles, wheezes, rhonchi. Symmetric lung excursion, no accessory respiratory muscle use.  Cardiovascular: normal S1/S2, no murmurs/rubs/gallops.   Abdomen: Not distended.  : deferred.    Neurological:  Mental: alert, follows commands,  /5 with ***/3 on memory recall, no aphasia or dysarthria. Fund of knowledge is {MYAPPROPRIATE:001898}  Cranial Nerves:  CN II: visual acuity - able to accurately count fingers with each eye. Visual fields intact, fundi: discs sharp, no papilledema and normal vessels bilaterally.  CN III, IV, VI: EOM intact, pupils equal and reactive  CN V: facial sensation nl  CN VII: face symmetric, no facial droop  CN VIII: hearing normal  CN IX: palate elevation symmetric, uvula at midline  CN XI SCM normal, shoulder shrug nl  CN XII: tongue midline  Motor: Strength: 5/5 in all major groups of all  extremities. Normal tone. No abnormal movements. No pronator drift b/l.  Reflexes: Triceps, biceps, brachioradialis, patellar, and achilles reflexes normal and symmetric. No clonus noted. Toes are down-going b/l.   Sensory: light touch, pinprick, and vibration intact. Romberg: negative.  Coordination: FNF and heel-shin tests intact b/l.   Gait:  Normal, able to tandem walk *** without difficulty.  DATA:   LABS/EEG/IMAGING/OTHER STUDIES: I reviewed pertinent medical records, as detailed in the history of present illness.  ASSESSMENT AND PLAN:      ASSESSMENT: Bonny Raymundo is a 79 year old female patient with listed above past medical history, who presents with left occipital stroke.    We had a detailed discussion with the patient regarding her presenting complaints.  The neurological exam today is ***.  I reviewed brain MRI and head/neck MRA with the patient.  She is already on Eliquis, reduced dosing due to her aplastic anemia.  The stroke occurred while on this dose of Eliquis.  I recommended the patient to discuss with her hematologist oncologist whether the dose needs to be increased or Eliquis should be switched to a different direct anticoagulant to prevent stroke recurrence.  I would also like to check her hemoglobin A1C and fasting lipid panel.  Do not think that we need to repeat her TTE as it will not change the management.    DIAGNOSES:  No diagnosis found.  PLAN: There are no Patient Instructions on file for this visit.    Total Time: *** minutes spent on the date of the encounter doing chart review, history and exam, documentation and further activities per the note.    Eliseo Cuevas MD  [facility] Neurology  (Chart documentation was completed in part with Dragon voice-recognition software. Even though reviewed, some grammatical, spelling, and word errors may remain.)           17-Feb-2024 11:45

## 2024-02-17 NOTE — OB RN TRIAGE NOTE - PRO MENTAL HEALTH SX RECENT

## 2024-02-17 NOTE — OB PROVIDER TRIAGE NOTE - NSOBPROVIDERNOTE_OBGYN_ALL_OB_FT
34yo female  @ 35.4 wks SLIUP uncomp PNC here from office for prolonged monitoring for fetal tachycardia  -pt reports having [employer] coffee prior to being hooked up for NST  -NST here is cat I with 145 baseline 32yo female  @ 35.4 wks SLIUP uncomp PNC here from office for prolonged monitoring for fetal tachycardia  -pt reports having [employer] coffee prior to being hooked up for NST  -NST here is cat I with 145 baseline  -BPP is 8/8  -pt was discussed with Dr Lopez;in to see pt  -pt was cleared for discharge home with instructions to keep her next appt on Thursday 2/22/2024  -pt discharged at 13:30

## 2024-02-17 NOTE — OB RN TRIAGE NOTE - FALL HARM RISK - UNIVERSAL INTERVENTIONS
Bed in lowest position, wheels locked, appropriate side rails in place/Call bell, personal items and telephone in reach/Instruct patient to call for assistance before getting out of bed or chair/Non-slip footwear when patient is out of bed/[place] to call system/Physically safe environment - no spills, clutter or unnecessary equipment/Purposeful Proactive Rounding/Room/bathroom lighting operational, light cord in reach

## 2024-02-17 NOTE — OB RN TRIAGE NOTE - CURRENT PREGNANCY COMPLICATIONS, OB PROFILE
Abnormal Amniotic Fluid Volume/Hypertensive Disorder Gestational Age less than 36 Weeks/Hypertensive Disorder

## 2024-02-19 DIAGNOSIS — O13.3 GESTATIONAL [PREGNANCY-INDUCED] HYPERTENSION WITHOUT SIGNIFICANT PROTEINURIA, THIRD TRIMESTER: ICD-10-CM

## 2024-02-19 DIAGNOSIS — O36.8330 MATERNAL CARE FOR ABNORMALITIES OF THE FETAL HEART RATE OR RHYTHM, THIRD TRIMESTER, NOT APPLICABLE OR UNSPECIFIED: ICD-10-CM

## 2024-02-19 DIAGNOSIS — Z3A.35 35 WEEKS GESTATION OF PREGNANCY: ICD-10-CM

## 2024-02-27 ENCOUNTER — INPATIENT (INPATIENT)
Facility: HOSPITAL | Age: 34
LOS: 2 days | Discharge: HOME | End: 2024-03-01
Attending: OBSTETRICS & GYNECOLOGY | Admitting: OBSTETRICS & GYNECOLOGY

## 2024-02-27 VITALS — RESPIRATION RATE: 16 BRPM | TEMPERATURE: 98 F

## 2024-02-27 DIAGNOSIS — O13.3 GESTATIONAL [PREGNANCY-INDUCED] HYPERTENSION WITHOUT SIGNIFICANT PROTEINURIA, THIRD TRIMESTER: ICD-10-CM

## 2024-02-27 RX ORDER — CHLORHEXIDINE GLUCONATE 213 G/1000ML
1 SOLUTION TOPICAL DAILY
Refills: 0 | Status: DISCONTINUED | OUTPATIENT
Start: 2024-02-27 | End: 2024-02-28

## 2024-02-27 RX ORDER — OXYTOCIN 10 UNIT/ML
333.33 VIAL (ML) INJECTION
Qty: 20 | Refills: 0 | Status: DISCONTINUED | OUTPATIENT
Start: 2024-02-27 | End: 2024-02-28

## 2024-02-27 RX ORDER — CITRIC ACID/SODIUM CITRATE 300-500 MG
15 SOLUTION, ORAL ORAL EVERY 6 HOURS
Refills: 0 | Status: DISCONTINUED | OUTPATIENT
Start: 2024-02-27 | End: 2024-02-28

## 2024-02-27 RX ORDER — SODIUM CHLORIDE 9 MG/ML
1000 INJECTION, SOLUTION INTRAVENOUS
Refills: 0 | Status: DISCONTINUED | OUTPATIENT
Start: 2024-02-27 | End: 2024-02-28

## 2024-02-27 RX ADMIN — SODIUM CHLORIDE 125 MILLILITER(S): 9 INJECTION, SOLUTION INTRAVENOUS at 23:47

## 2024-02-27 RX ADMIN — CHLORHEXIDINE GLUCONATE 1 APPLICATION(S): 213 SOLUTION TOPICAL at 23:48

## 2024-02-27 NOTE — OB RN PATIENT PROFILE - NSSDOHINSULT_OBGYN_A_OB
JOSIE CROOK 49y Female  MRN#: [id number]   CODE STATUS: Full  Hospital Day: 4d  Pt is currently admitted with the primary diagnosis of: bilateral hand/foot pain + blurry vision episodes    SUBJECTIVE  Overnight events: Patient still complaining of severe hand/foot pain bilaterally, 10/10, tearful.  Subjective complaints: Endorses pain, denies blurry vision, abdominal pain, constipation.                                          ----------------------------------------------------------  OBJECTIVE  PAST MEDICAL & SURGICAL HISTORY  Anxiety    Hypertension    No significant past surgical history                                          -----------------------------------------------------------  ALLERGIES:  No Known Allergies                                          ------------------------------------------------------------  HOME MEDICATIONS  Home Medications:  Ambien 10 mg oral tablet: 1 tab(s) orally once a day (at bedtime), As Needed (03 Dec 2021 08:35)  atenolol 100 mg oral tablet: 1 tab(s) orally once a day (03 Dec 2021 08:35)  famotidine 40 mg oral tablet: 1 tab(s) orally once a day (at bedtime) (03 Dec 2021 08:35)  Protonix 40 mg oral delayed release tablet: 1 tab(s) orally once a day (03 Dec 2021 08:35)  Xanax 1 mg oral tablet: 1 tab(s) orally 4 times a day, As Needed (03 Dec 2021 08:35)  Zanaflex 6 mg oral capsule: 1 cap(s) orally 3 times a day, As Needed (03 Dec 2021 08:35)                         MEDICATIONS:  STANDING MEDICATIONS  chlorhexidine 4% Liquid 1 Application(s) Topical <User Schedule>  enoxaparin Injectable 40 milliGRAM(s) SubCutaneous daily  famotidine    Tablet 40 milliGRAM(s) Oral daily  folic acid 1 milliGRAM(s) Oral daily  influenza   Vaccine 0.5 milliLiter(s) IntraMuscular once  pantoprazole    Tablet 40 milliGRAM(s) Oral before breakfast  pregabalin 25 milliGRAM(s) Oral three times a day    PRN MEDICATIONS  acetaminophen     Tablet .. 650 milliGRAM(s) Oral every 6 hours PRN  ALPRAZolam 1 milliGRAM(s) Oral every 6 hours PRN  morphine  - Injectable 4 milliGRAM(s) IV Push every 4 hours PRN  zolpidem 5 milliGRAM(s) Oral at bedtime PRN  zolpidem 5 milliGRAM(s) Oral at bedtime PRN                                          ------------------------------------------------------------  VITAL SIGNS: Last 24 Hours  T(C): 37 (06 Dec 2021 07:30), Max: 37 (06 Dec 2021 07:30)  T(F): 98.6 (06 Dec 2021 07:30), Max: 98.6 (06 Dec 2021 07:30)  HR: 104 (06 Dec 2021 07:30) (82 - 104)  BP: 177/90 (06 Dec 2021 07:30) (116/55 - 177/90)  BP(mean): --  RR: 18 (06 Dec 2021 07:30) (18 - 18)  SpO2: --    12-05-21 @ 07:01  -  12-06-21 @ 07:00  --------------------------------------------------------  IN: 570 mL / OUT: 0 mL / NET: 570 mL                                         --------------------------------------------------------------  LABS:             11.8   9.02  )-----------( 166      ( 06 Dec 2021 06:38 )             36.1     12-05    137  |  98  |  4<L>  ----------------------------<  82  4.2   |  21  |  0.6<L>    Ca    9.6      05 Dec 2021 04:30  Mg     1.6     12-05    Culture - Blood (collected 03 Dec 2021 11:00)  Source: .Blood Blood-Arterial  Preliminary Report (04 Dec 2021 22:01):    No growth to date.                                          -------------------------------------------------------------  RADIOLOGY:  < from: MR Head w/wo IV Cont (12.05.21 @ 15:55) >  IMPRESSION:    Nonspecific 8mm focus of enhancement within the right cerebellar hemisphere which likely represents a subacute infarct though a mass lesion cannot entirely be excluded. A short interval follow-up MRI is recommended.    < end of copied text >  < from: MR Cervical Spine w/wo IV Cont (12.05.21 @ 15:54) >  IMPRESSION:  Mild multilevel degenerative changes without central spinal canal or neuroforaminal narrowing.    No abnormal spinal cord signal or enhancement.    < end of copied text >                                          --------------------------------------------------------------  PHYSICAL EXAM:  General: alert, awake, in acute distress secondary to pain, tearful  HEENT: atraumatic  LUNGS: clear to auscultation bilaterally, no wheezes noted  HEART: regular rate/rhythm, no murmurs/gallops  ABDOMEN: soft, nontender, nondistended, normoactive bowel sounds  EXT: 2+ radial pulses, no edema noted  NEURO: aaox4, no focal deficits noted   SKIN: intact, no new lesions noted                                         --------------------------------------------------------------  ASSESSMENT & PLAN  Past medical history and hospital course:  49 year old female patient with HTN, GERD, and Anxiety who presented on 12/01 for evaluation of 2 months history of bilateral fingers and toe pain that followed bilateral thigh pain and was associated with intermittent episodes of blurry vision, found to have negative CT head findings, admitted for further neurological and rheumatological evaluation. Currently hemodynamically stable.      #Lethargy with Bilateral Hand and Feet Pain with Episodes of Blurry Vision  #Rule Out Multiple Sclerosis  #Rule Out Rheumatological Etiology  -2 months history of bilateral fingers and toe pain that followed bilateral thigh pain and was associated with intermittent episodes of blurry vision  -Status Post Outpatient EMG by Dr Valdez: insignificant -> referred to Rheumatology  -Status Post Failure to schedule an appointment with Rheumatology  -CT Head No Cont (12.01.21 @ 21:39) No evidence of intracranial hemorrhage, territorial infarct, or mass effect.  -Neurology Team Dr Trejo on board  -Rheumatology Team on board    -Monitor pain  -c/w Tylenol 650mg Q6h PRN  -c/w morphine 4mg q4hr PRN for pain  -c/w lyrica started on 12/3 (discontinued gabapentin on 12/3)    -further rheum work up ordered for am  -f/u rheum recs  -f/u neuro  -Pt agreeable to MRI w/wo con head and spine to r/o MS vs other neuro causes (pt initially refused MRI, but is now agreeable after explaining its importance, PRN librium ordered for prior to the MRI as pt has not tolerated prior MRIs)  -f/u MR head and spine r/o MS    --> Vitamin B12 level 12/02 received --> wnl  --> Folate Level 12/02 received --> low started folic acid  --> RPR 12/03 ordered --> negative  --> TSH 12/02 received --> wnl  --> RF 12/02 received --> negative  --> KAREN 12/02 received  --> negative  --> CCP 12/02 received  --> negative  --> Anti Jo1 12/02 received  --> negative  --> Scleroderma Antibody 12/02 received --> negative  --> Sjogren Antibody 12/02 received --> negative  --> ESR 12/02 --> 35 (H)  --> CRP12/02 --> 5 (H)  --> CK12/02 --> 26  --> Aldolase 12/02 --> collected   --> LDH 12/02 --> 185 hemolyzed  --> AM Cortisol level ordered for 12/03    -f/uplease per rheum:  dsDNA, anti U1rnp both ordered.   Unsure how to order anti Sm, anti Ro/La      #Exposure to COVID Positive Patient on 4B 12/02 AM  * Transported from ED to  23 on 12/02  * Patient's room mate on 4b 23 was COVID positive  * Patient spent 60 minutes in room before she was transported to  26 B  * COVID 12/01 negative    -Infectious Disease Team contacted over phone  -Infection Control contacted at [phone]  -Will place patient under Droplet Precautions  -Will repeat COVID swab 12/02 and in 8-10 days (per Infection Control Policy)    #Pt not pregnant, possibly elevated 2/2 xanax use  #Positive Pregnancy Test serum and beta-HCG  #Negative Urine Preg Test  #Rule Out False Positive Test in Setting of Xanax Use  * On Xanax for anxiety  * HCG 11/16 9.2 -> 12/02 7.4  * Serum Pregnancy Test 12/01 positive  -TVUS 12/2: uterine fibroid, no intrauterine gestation.  -FSH/LH: wnl  -TSH: wnl  -urine preg test: negative  -pt reports having no period in ~6 years    -spoke with OB/GYN on 12/4 --> pt not pregnant, likely phantum beta-HCG vs xanax use --> no evidence for GYN malignancy at this time as the review of TVUS  -f[facility]    #Elevated Blood Pressure  #Possibly Related to Pain Versus Undiagnosed Hypertension  -ED /54 mmHg   -Currently off antihypertensives  -Pain control as detailed above for now    -Monitor BP    #Anxiety  -On Xanax for anxiety    -c/w home xanax      #Others  - DVT Prophylaxis: lovenox  - GI Prophylaxis: Pantoprazole 40mg PO QD  - Diet: DASH/ TLC  - Code Status: Full    -Dispo: pending MR head and spine and rheum workup    Provider Handoff: (Pending) - follow up:   -f/u rheum recs and labs  -f/u neuro recs  -f/u MR w/wo con Head and spine  -f/u OB/GYN outpatient w/ pt's GYN or [facility] if pt has no GYN                                                                            ----------------------------------------------------  # DVT prophylaxis: Lovenox 40mg subQ daily  # GI prophylaxis: Protonix 40mg PO qAM  # Diet: DASH/TLC  # Activity: Increase as Tolerated  # Code status: Full  # Disposition: Remain on Floor                                                                           --------------------------------------------------------  # Handoff: JOSIE CROOK 49y Female  MRN#: [id number]   CODE STATUS: Full  Hospital Day: 4d  Pt is currently admitted with the primary diagnosis of: bilateral hand/foot pain + blurry vision episodes    SUBJECTIVE  Overnight events: Patient still complaining of severe hand/foot pain bilaterally, 10/10, tearful.  Subjective complaints: Endorses pain, denies blurry vision, abdominal pain, constipation.                                          ----------------------------------------------------------  OBJECTIVE  PAST MEDICAL & SURGICAL HISTORY  Anxiety    Hypertension    No significant past surgical history                                          -----------------------------------------------------------  ALLERGIES:  No Known Allergies                                          ------------------------------------------------------------  HOME MEDICATIONS  Home Medications:  Ambien 10 mg oral tablet: 1 tab(s) orally once a day (at bedtime), As Needed (03 Dec 2021 08:35)  atenolol 100 mg oral tablet: 1 tab(s) orally once a day (03 Dec 2021 08:35)  famotidine 40 mg oral tablet: 1 tab(s) orally once a day (at bedtime) (03 Dec 2021 08:35)  Protonix 40 mg oral delayed release tablet: 1 tab(s) orally once a day (03 Dec 2021 08:35)  Xanax 1 mg oral tablet: 1 tab(s) orally 4 times a day, As Needed (03 Dec 2021 08:35)  Zanaflex 6 mg oral capsule: 1 cap(s) orally 3 times a day, As Needed (03 Dec 2021 08:35)                         MEDICATIONS:  STANDING MEDICATIONS  chlorhexidine 4% Liquid 1 Application(s) Topical <User Schedule>  enoxaparin Injectable 40 milliGRAM(s) SubCutaneous daily  famotidine    Tablet 40 milliGRAM(s) Oral daily  folic acid 1 milliGRAM(s) Oral daily  influenza   Vaccine 0.5 milliLiter(s) IntraMuscular once  pantoprazole    Tablet 40 milliGRAM(s) Oral before breakfast  pregabalin 25 milliGRAM(s) Oral three times a day    PRN MEDICATIONS  acetaminophen     Tablet .. 650 milliGRAM(s) Oral every 6 hours PRN  ALPRAZolam 1 milliGRAM(s) Oral every 6 hours PRN  morphine  - Injectable 4 milliGRAM(s) IV Push every 4 hours PRN  zolpidem 5 milliGRAM(s) Oral at bedtime PRN  zolpidem 5 milliGRAM(s) Oral at bedtime PRN                                          ------------------------------------------------------------  VITAL SIGNS: Last 24 Hours  T(C): 37 (06 Dec 2021 07:30), Max: 37 (06 Dec 2021 07:30)  T(F): 98.6 (06 Dec 2021 07:30), Max: 98.6 (06 Dec 2021 07:30)  HR: 104 (06 Dec 2021 07:30) (82 - 104)  BP: 177/90 (06 Dec 2021 07:30) (116/55 - 177/90)  BP(mean): --  RR: 18 (06 Dec 2021 07:30) (18 - 18)  SpO2: --    12-05-21 @ 07:01  -  12-06-21 @ 07:00  --------------------------------------------------------  IN: 570 mL / OUT: 0 mL / NET: 570 mL                                         --------------------------------------------------------------  LABS:             11.8   9.02  )-----------( 166      ( 06 Dec 2021 06:38 )             36.1     12-05    137  |  98  |  4<L>  ----------------------------<  82  4.2   |  21  |  0.6<L>    Ca    9.6      05 Dec 2021 04:30  Mg     1.6     12-05    Culture - Blood (collected 03 Dec 2021 11:00)  Source: .Blood Blood-Arterial  Preliminary Report (04 Dec 2021 22:01):    No growth to date.                                          -------------------------------------------------------------  RADIOLOGY:  < from: MR Head w/wo IV Cont (12.05.21 @ 15:55) >  IMPRESSION:    Nonspecific 8mm focus of enhancement within the right cerebellar hemisphere which likely represents a subacute infarct though a mass lesion cannot entirely be excluded. A short interval follow-up MRI is recommended.    < end of copied text >  < from: MR Cervical Spine w/wo IV Cont (12.05.21 @ 15:54) >  IMPRESSION:  Mild multilevel degenerative changes without central spinal canal or neuroforaminal narrowing.    No abnormal spinal cord signal or enhancement.    < end of copied text >                                          --------------------------------------------------------------  PHYSICAL EXAM:  General: alert, awake, in acute distress secondary to pain, tearful  HEENT: atraumatic  LUNGS: clear to auscultation bilaterally, no wheezes noted  HEART: regular rate/rhythm, no murmurs/gallops  ABDOMEN: soft, nontender, nondistended, normoactive bowel sounds  EXT: 2+ radial pulses, no edema noted  NEURO: aaox4, no focal deficits noted   SKIN: intact, no new lesions noted                                         --------------------------------------------------------------  ASSESSMENT & PLAN  Past medical history and hospital course:  49 year old female patient with HTN, GERD, and Anxiety who presented on 12/01 for evaluation of 2 months history of bilateral fingers and toe pain that followed bilateral thigh pain and was associated with intermittent episodes of blurry vision, found to have negative CT head findings, admitted for further neurological and rheumatological evaluation. Currently hemodynamically stable.    #Bilateral Hand/Foot Pain with Episodes of #Blurry Vision - blurry vision episodes have resolveda as per patient, bilateral hand/foot pain still severe 10/10  - s/p outpatient EMG by Dr Valdez, was unremarkable, was referred to rheumatology  - 12/1 CT Head without contrast unremarkable  - Neurology + Rheumatology on board  - Pain control: IV Morphine 4mg q4h PRN for severe pain (all PO formulations have not worked)  - 12/5 MRI Brain: Nonspecific 8mm focus of enhancement within the right cerebellar hemisphere which likely represents a subacute infarct though a mass lesion cannot entirely be excluded. A short interval follow-up MRI is recommended, NeuroSx consult placed today  - Workup: Folate low (on supplementation), RPR, TSH, B12, KAREN, CCP, Anti-Jo1, Scl-ab, Sj-ab, CK WNL  - CRP/ESR mildly elevated, LDH unremarkable, AM Cortisol pending, Aldolase pending, dsDNA/anti-U1-RNP WNL, anti-Sm + anti-Ro/La tomorrow AM    #Covid-19 Exposure - exposed to Covid+ patient 12/2, droplet precuations, swab on 12/2 negative, repeat in 8-10d, asymptomatic    #Positive Serum Pregnancy Test - x2 (in Nov+Dec, +hCG, negative urine pregnancy test on 12/3)  - may be secondary to Xanax use, negative TVUS for gestation on 12/2  - FSH/LH/TSH WNL   - patient may be post-menopausal, has not had menses in ~6y  - GYN consulted, outpatient f/u    #Anxiety - on Xanax PO 1mg q6h PRN                                                                              ----------------------------------------------------  # DVT prophylaxis: Lovenox 40mg subQ daily  # GI prophylaxis: Protonix 40mg PO qAM  # Diet: DASH/TLC  # Activity: Increase as Tolerated  # Code status: Full  # Disposition: Remain on Floor                                                                           --------------------------------------------------------  # Handoff: f/u with Neuro + NeuroSx JOSIE CROOK 49y Female  MRN#: [id number]   CODE STATUS: Full  Hospital Day: 4d  Pt is currently admitted with the primary diagnosis of: bilateral hand/foot pain + blurry vision episodes    SUBJECTIVE  Overnight events: Patient still complaining of severe hand/foot pain bilaterally, 10/10, tearful.  Subjective complaints: Endorses pain, denies blurry vision, abdominal pain, constipation.                                          ----------------------------------------------------------  OBJECTIVE  PAST MEDICAL & SURGICAL HISTORY  Anxiety    Hypertension    No significant past surgical history                                          -----------------------------------------------------------  ALLERGIES:  No Known Allergies                                          ------------------------------------------------------------  HOME MEDICATIONS  Home Medications:  Ambien 10 mg oral tablet: 1 tab(s) orally once a day (at bedtime), As Needed (03 Dec 2021 08:35)  atenolol 100 mg oral tablet: 1 tab(s) orally once a day (03 Dec 2021 08:35)  famotidine 40 mg oral tablet: 1 tab(s) orally once a day (at bedtime) (03 Dec 2021 08:35)  Protonix 40 mg oral delayed release tablet: 1 tab(s) orally once a day (03 Dec 2021 08:35)  Xanax 1 mg oral tablet: 1 tab(s) orally 4 times a day, As Needed (03 Dec 2021 08:35)  Zanaflex 6 mg oral capsule: 1 cap(s) orally 3 times a day, As Needed (03 Dec 2021 08:35)                         MEDICATIONS:  STANDING MEDICATIONS  chlorhexidine 4% Liquid 1 Application(s) Topical <User Schedule>  enoxaparin Injectable 40 milliGRAM(s) SubCutaneous daily  famotidine    Tablet 40 milliGRAM(s) Oral daily  folic acid 1 milliGRAM(s) Oral daily  influenza   Vaccine 0.5 milliLiter(s) IntraMuscular once  pantoprazole    Tablet 40 milliGRAM(s) Oral before breakfast  pregabalin 25 milliGRAM(s) Oral three times a day    PRN MEDICATIONS  acetaminophen     Tablet .. 650 milliGRAM(s) Oral every 6 hours PRN  ALPRAZolam 1 milliGRAM(s) Oral every 6 hours PRN  morphine  - Injectable 4 milliGRAM(s) IV Push every 4 hours PRN  zolpidem 5 milliGRAM(s) Oral at bedtime PRN  zolpidem 5 milliGRAM(s) Oral at bedtime PRN                                          ------------------------------------------------------------  VITAL SIGNS: Last 24 Hours  T(C): 37 (06 Dec 2021 07:30), Max: 37 (06 Dec 2021 07:30)  T(F): 98.6 (06 Dec 2021 07:30), Max: 98.6 (06 Dec 2021 07:30)  HR: 104 (06 Dec 2021 07:30) (82 - 104)  BP: 177/90 (06 Dec 2021 07:30) (116/55 - 177/90)  BP(mean): --  RR: 18 (06 Dec 2021 07:30) (18 - 18)  SpO2: --    12-05-21 @ 07:01  -  12-06-21 @ 07:00  --------------------------------------------------------  IN: 570 mL / OUT: 0 mL / NET: 570 mL                                         --------------------------------------------------------------  LABS:             11.8   9.02  )-----------( 166      ( 06 Dec 2021 06:38 )             36.1     12-05    137  |  98  |  4<L>  ----------------------------<  82  4.2   |  21  |  0.6<L>    Ca    9.6      05 Dec 2021 04:30  Mg     1.6     12-05    Culture - Blood (collected 03 Dec 2021 11:00)  Source: .Blood Blood-Arterial  Preliminary Report (04 Dec 2021 22:01):    No growth to date.                                          -------------------------------------------------------------  RADIOLOGY:  < from: MR Head w/wo IV Cont (12.05.21 @ 15:55) >  IMPRESSION:    Nonspecific 8mm focus of enhancement within the right cerebellar hemisphere which likely represents a subacute infarct though a mass lesion cannot entirely be excluded. A short interval follow-up MRI is recommended.    < end of copied text >  < from: MR Cervical Spine w/wo IV Cont (12.05.21 @ 15:54) >  IMPRESSION:  Mild multilevel degenerative changes without central spinal canal or neuroforaminal narrowing.    No abnormal spinal cord signal or enhancement.    < end of copied text >                                          --------------------------------------------------------------  PHYSICAL EXAM:  General: alert, awake, in acute distress secondary to pain, tearful  HEENT: atraumatic  LUNGS: clear to auscultation bilaterally, no wheezes noted  HEART: regular rate/rhythm, no murmurs/gallops  ABDOMEN: soft, nontender, nondistended, normoactive bowel sounds  EXT: 2+ radial pulses, no edema noted  NEURO: aaox4, no focal deficits noted   SKIN: intact, no new lesions noted                                         --------------------------------------------------------------  ASSESSMENT & PLAN  Past medical history and hospital course:  49 year old female patient with HTN, GERD, and Anxiety who presented on 12/01 for evaluation of 2 months history of bilateral fingers and toe pain that followed bilateral thigh pain and was associated with intermittent episodes of blurry vision, found to have negative CT head findings, admitted for further neurological and rheumatological evaluation. Currently hemodynamically stable.    #Bilateral Hand/Foot Pain with Episodes of #Blurry Vision - blurry vision episodes have resolved as per patient, bilateral hand/foot pain still severe 10/10  - s/p outpatient EMG by Dr. Valdez, was unremarkable, was referred to rheumatology  - 12/1 CT Head without contrast unremarkable  - Neurology + Rheumatology on board  - Pain control: PO Percocet 2 tabs q6h PRN, possible seeking behavior as refuses all PO meds and only wants IV morphine, will consult pain management  - 12/5 MRI Brain: Nonspecific 8mm focus of enhancement within the right cerebellar hemisphere which likely represents a subacute infarct though a mass lesion cannot entirely be excluded. A short interval follow-up MRI is recommended, NeuroSx consult placed today, will f/u with Neuro, per phone call with PA #[id number] will message team to f/u on patient today  - Workup: Folate low (on supplementation), RPR, TSH, B12, KAREN, CCP, Anti-Jo1, Scl-ab, Sj-ab, CK WNL  - CRP/ESR mildly elevated, LDH unremarkable, AM Cortisol pending, Aldolase pending, dsDNA/anti-U1-RNP WNL, anti-Sm + anti-Ro/La tomorrow AM    #Covid-19 Exposure - exposed to Covid+ patient 12/2, droplet precautions swab on 12/2 negative, repeat in 8-10d, asymptomatic    #Positive Serum Pregnancy Test - x2 (in Nov+Dec, +hCG, negative urine pregnancy test on 12/3)  - may be secondary to Xanax use, negative TVUS for gestation on 12/2  - FSH/LH/TSH WNL   - patient may be post-menopausal, has not had menses in ~6y  - GYN consulted, outpatient f/u    #Anxiety - on Xanax PO 1mg q6h PRN                                                                            ----------------------------------------------------  # DVT prophylaxis: Lovenox 40mg subQ daily  # GI prophylaxis: Protonix 40mg PO qAM  # Diet: DASH/TLC  # Activity: Increase as Tolerated  # Code status: Full  # Disposition: Remain on Floor                                                                           --------------------------------------------------------  # Handoff: f/u with Neuro + NeuroSx + Pain Management JOSIE CROOK 49y Female  MRN#: [id number]   CODE STATUS: Full  Hospital Day: 4d  Pt is currently admitted with the primary diagnosis of: bilateral hand/foot pain + blurry vision episodes    SUBJECTIVE  Overnight events: Patient still complaining of severe hand/foot pain bilaterally, 10/10, tearful.  Subjective complaints: Endorses pain, denies blurry vision, abdominal pain, constipation.                                          ----------------------------------------------------------  OBJECTIVE  PAST MEDICAL & SURGICAL HISTORY  Anxiety    Hypertension    No significant past surgical history                                          -----------------------------------------------------------  ALLERGIES:  No Known Allergies                                          ------------------------------------------------------------  HOME MEDICATIONS  Home Medications:  Ambien 10 mg oral tablet: 1 tab(s) orally once a day (at bedtime), As Needed (03 Dec 2021 08:35)  atenolol 100 mg oral tablet: 1 tab(s) orally once a day (03 Dec 2021 08:35)  famotidine 40 mg oral tablet: 1 tab(s) orally once a day (at bedtime) (03 Dec 2021 08:35)  Protonix 40 mg oral delayed release tablet: 1 tab(s) orally once a day (03 Dec 2021 08:35)  Xanax 1 mg oral tablet: 1 tab(s) orally 4 times a day, As Needed (03 Dec 2021 08:35)  Zanaflex 6 mg oral capsule: 1 cap(s) orally 3 times a day, As Needed (03 Dec 2021 08:35)                         MEDICATIONS:  STANDING MEDICATIONS  chlorhexidine 4% Liquid 1 Application(s) Topical <User Schedule>  enoxaparin Injectable 40 milliGRAM(s) SubCutaneous daily  famotidine    Tablet 40 milliGRAM(s) Oral daily  folic acid 1 milliGRAM(s) Oral daily  influenza   Vaccine 0.5 milliLiter(s) IntraMuscular once  pantoprazole    Tablet 40 milliGRAM(s) Oral before breakfast  pregabalin 25 milliGRAM(s) Oral three times a day    PRN MEDICATIONS  acetaminophen     Tablet .. 650 milliGRAM(s) Oral every 6 hours PRN  ALPRAZolam 1 milliGRAM(s) Oral every 6 hours PRN  morphine  - Injectable 4 milliGRAM(s) IV Push every 4 hours PRN  zolpidem 5 milliGRAM(s) Oral at bedtime PRN  zolpidem 5 milliGRAM(s) Oral at bedtime PRN                                          ------------------------------------------------------------  VITAL SIGNS: Last 24 Hours  T(C): 37 (06 Dec 2021 07:30), Max: 37 (06 Dec 2021 07:30)  T(F): 98.6 (06 Dec 2021 07:30), Max: 98.6 (06 Dec 2021 07:30)  HR: 104 (06 Dec 2021 07:30) (82 - 104)  BP: 177/90 (06 Dec 2021 07:30) (116/55 - 177/90)  BP(mean): --  RR: 18 (06 Dec 2021 07:30) (18 - 18)  SpO2: --    12-05-21 @ 07:01  -  12-06-21 @ 07:00  --------------------------------------------------------  IN: 570 mL / OUT: 0 mL / NET: 570 mL                                         --------------------------------------------------------------  LABS:             11.8   9.02  )-----------( 166      ( 06 Dec 2021 06:38 )             36.1     12-05    137  |  98  |  4<L>  ----------------------------<  82  4.2   |  21  |  0.6<L>    Ca    9.6      05 Dec 2021 04:30  Mg     1.6     12-05    Culture - Blood (collected 03 Dec 2021 11:00)  Source: .Blood Blood-Arterial  Preliminary Report (04 Dec 2021 22:01):    No growth to date.                                          -------------------------------------------------------------  RADIOLOGY:  < from: MR Head w/wo IV Cont (12.05.21 @ 15:55) >  IMPRESSION:    Nonspecific 8mm focus of enhancement within the right cerebellar hemisphere which likely represents a subacute infarct though a mass lesion cannot entirely be excluded. A short interval follow-up MRI is recommended.    < end of copied text >  < from: MR Cervical Spine w/wo IV Cont (12.05.21 @ 15:54) >  IMPRESSION:  Mild multilevel degenerative changes without central spinal canal or neuroforaminal narrowing.    No abnormal spinal cord signal or enhancement.    < end of copied text >                                          --------------------------------------------------------------  PHYSICAL EXAM:  General: alert, awake, in acute distress secondary to pain, tearful  HEENT: atraumatic  LUNGS: clear to auscultation bilaterally, no wheezes noted  HEART: regular rate/rhythm, no murmurs/gallops  ABDOMEN: soft, nontender, nondistended, normoactive bowel sounds  EXT: 2+ radial pulses, no edema noted  NEURO: aaox4, no focal deficits noted   SKIN: intact, no new lesions noted                                         --------------------------------------------------------------  ASSESSMENT & PLAN  Past medical history and hospital course:  49 year old female patient with HTN, GERD, and Anxiety who presented on 12/01 for evaluation of 2 months history of bilateral fingers and toe pain that followed bilateral thigh pain and was associated with intermittent episodes of blurry vision, found to have negative CT head findings, admitted for further neurological and rheumatological evaluation. Currently hemodynamically stable.    #Bilateral Hand/Foot Pain with Episodes of #Blurry Vision - blurry vision episodes have resolved as per patient, bilateral hand/foot pain still severe 10/10  - s/p outpatient EMG by Dr. Valdez, was unremarkable, was referred to rheumatology  - 12/1 CT Head without contrast unremarkable  - Neurology + Rheumatology on board  - Pain control: PO Percocet 2 tabs q6h PRN, possible seeking behavior as refuses all PO meds and only wants IV morphine, will consult pain management  - 12/5 MRI Brain: Nonspecific 8mm focus of enhancement within the right cerebellar hemisphere which likely represents a subacute infarct though a mass lesion cannot entirely be excluded. A short interval follow-up MRI is recommended, NeuroSx consult placed today, will f/u with Neuro, per phone call with PA #[phone] will message team to f/u on patient today  - Workup: Folate low (on supplementation), RPR, TSH, B12, KAREN, CCP, Anti-Jo1, Scl-ab, Sj-ab, CK WNL  - CRP/ESR mildly elevated, LDH unremarkable, AM Cortisol pending, Aldolase pending, dsDNA/anti-U1-RNP WNL, anti-Sm tomorrow AM    #Covid-19 Exposure - exposed to Covid+ patient 12/2, droplet precautions swab on 12/2 negative, repeat in 8-10d, asymptomatic    #Positive Serum Pregnancy Test - x2 (in Nov+Dec, +hCG, negative urine pregnancy test on 12/3)  - may be secondary to Xanax use, negative TVUS for gestation on 12/2  - FSH/LH/TSH WNL   - patient may be post-menopausal, has not had menses in ~6y  - GYN consulted, outpatient f/u    #Anxiety - on Xanax PO 1mg q6h PRN                                                                            ----------------------------------------------------  # DVT prophylaxis: Lovenox 40mg subQ daily  # GI prophylaxis: Protonix 40mg PO qAM  # Diet: DASH/TLC  # Activity: Increase as Tolerated  # Code status: Full  # Disposition: Remain on Floor                                                                           --------------------------------------------------------  # Handoff: f/u with Neuro + NeuroSx + Pain Management never

## 2024-02-28 ENCOUNTER — TRANSCRIPTION ENCOUNTER (OUTPATIENT)
Age: 34
End: 2024-02-28

## 2024-02-28 LAB
ALBUMIN SERPL ELPH-MCNC: 3.5 G/DL — SIGNIFICANT CHANGE UP (ref 3.3–5)
ALP SERPL-CCNC: 106 U/L — SIGNIFICANT CHANGE UP (ref 40–120)
ALT FLD-CCNC: 15 U/L — SIGNIFICANT CHANGE UP (ref 4–33)
ANION GAP SERPL CALC-SCNC: 12 MMOL/L — SIGNIFICANT CHANGE UP (ref 7–14)
APPEARANCE UR: ABNORMAL
APTT BLD: 29.1 SEC — SIGNIFICANT CHANGE UP (ref 24.5–35.6)
AST SERPL-CCNC: 18 U/L — SIGNIFICANT CHANGE UP (ref 4–32)
BACTERIA # UR AUTO: ABNORMAL /HPF
BASOPHILS # BLD AUTO: 0.02 K/UL — SIGNIFICANT CHANGE UP (ref 0–0.2)
BASOPHILS NFR BLD AUTO: 0.2 % — SIGNIFICANT CHANGE UP (ref 0–2)
BILIRUB SERPL-MCNC: <0.2 MG/DL — SIGNIFICANT CHANGE UP (ref 0.2–1.2)
BILIRUB UR-MCNC: NEGATIVE — SIGNIFICANT CHANGE UP
BLD GP AB SCN SERPL QL: NEGATIVE — SIGNIFICANT CHANGE UP
BUN SERPL-MCNC: 8 MG/DL — SIGNIFICANT CHANGE UP (ref 7–23)
CALCIUM SERPL-MCNC: 9.4 MG/DL — SIGNIFICANT CHANGE UP (ref 8.4–10.5)
CAST: 0 /LPF — SIGNIFICANT CHANGE UP (ref 0–4)
CHLORIDE SERPL-SCNC: 102 MMOL/L — SIGNIFICANT CHANGE UP (ref 98–107)
CO2 SERPL-SCNC: 21 MMOL/L — LOW (ref 22–31)
COLOR SPEC: YELLOW — SIGNIFICANT CHANGE UP
CREAT ?TM UR-MCNC: 37 MG/DL — SIGNIFICANT CHANGE UP
CREAT SERPL-MCNC: 0.4 MG/DL — LOW (ref 0.5–1.3)
DIFF PNL FLD: NEGATIVE — SIGNIFICANT CHANGE UP
EGFR: 135 ML/MIN/1.73M2 — SIGNIFICANT CHANGE UP
EOSINOPHIL # BLD AUTO: 0.07 K/UL — SIGNIFICANT CHANGE UP (ref 0–0.5)
EOSINOPHIL NFR BLD AUTO: 0.7 % — SIGNIFICANT CHANGE UP (ref 0–6)
FIBRINOGEN PPP-MCNC: 720 MG/DL — HIGH (ref 200–465)
GLUCOSE SERPL-MCNC: 108 MG/DL — HIGH (ref 70–99)
GLUCOSE UR QL: NEGATIVE MG/DL — SIGNIFICANT CHANGE UP
HCT VFR BLD CALC: 37.9 % — SIGNIFICANT CHANGE UP (ref 34.5–45)
HGB BLD-MCNC: 12.8 G/DL — SIGNIFICANT CHANGE UP (ref 11.5–15.5)
IANC: 6.42 K/UL — SIGNIFICANT CHANGE UP (ref 1.8–7.4)
IMM GRANULOCYTES NFR BLD AUTO: 0.8 % — SIGNIFICANT CHANGE UP (ref 0–0.9)
INR BLD: 0.97 RATIO — SIGNIFICANT CHANGE UP (ref 0.85–1.18)
KETONES UR-MCNC: NEGATIVE MG/DL — SIGNIFICANT CHANGE UP
LDH SERPL L TO P-CCNC: 146 U/L — SIGNIFICANT CHANGE UP (ref 135–225)
LEUKOCYTE ESTERASE UR-ACNC: ABNORMAL
LYMPHOCYTES # BLD AUTO: 2.7 K/UL — SIGNIFICANT CHANGE UP (ref 1–3.3)
LYMPHOCYTES # BLD AUTO: 26.7 % — SIGNIFICANT CHANGE UP (ref 13–44)
MCHC RBC-ENTMCNC: 29.4 PG — SIGNIFICANT CHANGE UP (ref 27–34)
MCHC RBC-ENTMCNC: 33.8 GM/DL — SIGNIFICANT CHANGE UP (ref 32–36)
MCV RBC AUTO: 87.1 FL — SIGNIFICANT CHANGE UP (ref 80–100)
MONOCYTES # BLD AUTO: 0.83 K/UL — SIGNIFICANT CHANGE UP (ref 0–0.9)
MONOCYTES NFR BLD AUTO: 8.2 % — SIGNIFICANT CHANGE UP (ref 2–14)
NEUTROPHILS # BLD AUTO: 6.42 K/UL — SIGNIFICANT CHANGE UP (ref 1.8–7.4)
NEUTROPHILS NFR BLD AUTO: 63.4 % — SIGNIFICANT CHANGE UP (ref 43–77)
NITRITE UR-MCNC: NEGATIVE — SIGNIFICANT CHANGE UP
NRBC # BLD: 0 /100 WBCS — SIGNIFICANT CHANGE UP (ref 0–0)
NRBC # FLD: 0 K/UL — SIGNIFICANT CHANGE UP (ref 0–0)
PH UR: 6.5 — SIGNIFICANT CHANGE UP (ref 5–8)
PLATELET # BLD AUTO: 233 K/UL — SIGNIFICANT CHANGE UP (ref 150–400)
POTASSIUM SERPL-MCNC: 3.9 MMOL/L — SIGNIFICANT CHANGE UP (ref 3.5–5.3)
POTASSIUM SERPL-SCNC: 3.9 MMOL/L — SIGNIFICANT CHANGE UP (ref 3.5–5.3)
PROT ?TM UR-MCNC: 7 MG/DL — SIGNIFICANT CHANGE UP
PROT SERPL-MCNC: 6.8 G/DL — SIGNIFICANT CHANGE UP (ref 6–8.3)
PROT UR-MCNC: NEGATIVE MG/DL — SIGNIFICANT CHANGE UP
PROT/CREAT UR-RTO: 0.2 RATIO — SIGNIFICANT CHANGE UP (ref 0–0.2)
PROTHROM AB SERPL-ACNC: 11 SEC — SIGNIFICANT CHANGE UP (ref 9.5–13)
RBC # BLD: 4.35 M/UL — SIGNIFICANT CHANGE UP (ref 3.8–5.2)
RBC # FLD: 13.4 % — SIGNIFICANT CHANGE UP (ref 10.3–14.5)
RBC CASTS # UR COMP ASSIST: 0 /HPF — SIGNIFICANT CHANGE UP (ref 0–4)
RH IG SCN BLD-IMP: POSITIVE — SIGNIFICANT CHANGE UP
SODIUM SERPL-SCNC: 135 MMOL/L — SIGNIFICANT CHANGE UP (ref 135–145)
SP GR SPEC: 1.01 — SIGNIFICANT CHANGE UP (ref 1–1.03)
SQUAMOUS # UR AUTO: 6 /HPF — HIGH (ref 0–5)
T PALLIDUM AB TITR SER: NEGATIVE — SIGNIFICANT CHANGE UP
URATE SERPL-MCNC: 3.1 MG/DL — SIGNIFICANT CHANGE UP (ref 2.5–7)
UROBILINOGEN FLD QL: 0.2 MG/DL — SIGNIFICANT CHANGE UP (ref 0.2–1)
WBC # BLD: 10.12 K/UL — SIGNIFICANT CHANGE UP (ref 3.8–10.5)
WBC # FLD AUTO: 10.12 K/UL — SIGNIFICANT CHANGE UP (ref 3.8–10.5)
WBC UR QL: 17 /HPF — HIGH (ref 0–5)

## 2024-02-28 RX ORDER — SODIUM CHLORIDE 9 MG/ML
300 INJECTION INTRAMUSCULAR; INTRAVENOUS; SUBCUTANEOUS ONCE
Refills: 0 | Status: DISCONTINUED | OUTPATIENT
Start: 2024-02-28 | End: 2024-03-01

## 2024-02-28 RX ORDER — DIPHENHYDRAMINE HCL 50 MG
25 CAPSULE ORAL EVERY 6 HOURS
Refills: 0 | Status: DISCONTINUED | OUTPATIENT
Start: 2024-02-28 | End: 2024-03-01

## 2024-02-28 RX ORDER — PRAMOXINE HYDROCHLORIDE 150 MG/15G
1 AEROSOL, FOAM RECTAL EVERY 4 HOURS
Refills: 0 | Status: DISCONTINUED | OUTPATIENT
Start: 2024-02-28 | End: 2024-03-01

## 2024-02-28 RX ORDER — OXYTOCIN 10 UNIT/ML
41.67 VIAL (ML) INJECTION
Qty: 20 | Refills: 0 | Status: DISCONTINUED | OUTPATIENT
Start: 2024-02-28 | End: 2024-03-01

## 2024-02-28 RX ORDER — ASPIRIN/CALCIUM CARB/MAGNESIUM 324 MG
1 TABLET ORAL
Refills: 0 | DISCHARGE

## 2024-02-28 RX ORDER — BENZOCAINE 10 %
1 GEL (GRAM) MUCOUS MEMBRANE EVERY 6 HOURS
Refills: 0 | Status: DISCONTINUED | OUTPATIENT
Start: 2024-02-28 | End: 2024-03-01

## 2024-02-28 RX ORDER — ACETAMINOPHEN 500 MG
975 TABLET ORAL
Refills: 0 | Status: DISCONTINUED | OUTPATIENT
Start: 2024-02-28 | End: 2024-03-01

## 2024-02-28 RX ORDER — HYDROCORTISONE 1 %
1 OINTMENT (GRAM) TOPICAL EVERY 6 HOURS
Refills: 0 | Status: DISCONTINUED | OUTPATIENT
Start: 2024-02-28 | End: 2024-03-01

## 2024-02-28 RX ORDER — AER TRAVELER 0.5 G/1
1 SOLUTION RECTAL; TOPICAL
Qty: 0 | Refills: 0 | DISCHARGE
Start: 2024-02-28

## 2024-02-28 RX ORDER — IBUPROFEN 200 MG
600 TABLET ORAL EVERY 6 HOURS
Refills: 0 | Status: COMPLETED | OUTPATIENT
Start: 2024-02-28 | End: 2025-01-26

## 2024-02-28 RX ORDER — OXYTOCIN 10 UNIT/ML
2 VIAL (ML) INJECTION
Qty: 30 | Refills: 0 | Status: DISCONTINUED | OUTPATIENT
Start: 2024-02-28 | End: 2024-03-01

## 2024-02-28 RX ORDER — IBUPROFEN 200 MG
1 TABLET ORAL
Qty: 0 | Refills: 0 | DISCHARGE
Start: 2024-02-28

## 2024-02-28 RX ORDER — SODIUM CHLORIDE 9 MG/ML
1000 INJECTION INTRAMUSCULAR; INTRAVENOUS; SUBCUTANEOUS
Refills: 0 | Status: DISCONTINUED | OUTPATIENT
Start: 2024-02-28 | End: 2024-03-01

## 2024-02-28 RX ORDER — DIBUCAINE 1 %
1 OINTMENT (GRAM) RECTAL EVERY 6 HOURS
Refills: 0 | Status: DISCONTINUED | OUTPATIENT
Start: 2024-02-28 | End: 2024-03-01

## 2024-02-28 RX ORDER — SODIUM CHLORIDE 9 MG/ML
1000 INJECTION INTRAMUSCULAR; INTRAVENOUS; SUBCUTANEOUS
Refills: 0 | Status: DISCONTINUED | OUTPATIENT
Start: 2024-02-28 | End: 2024-02-28

## 2024-02-28 RX ORDER — OXYCODONE HYDROCHLORIDE 5 MG/1
5 TABLET ORAL
Refills: 0 | Status: DISCONTINUED | OUTPATIENT
Start: 2024-02-28 | End: 2024-03-01

## 2024-02-28 RX ORDER — SIMETHICONE 80 MG/1
80 TABLET, CHEWABLE ORAL EVERY 4 HOURS
Refills: 0 | Status: DISCONTINUED | OUTPATIENT
Start: 2024-02-28 | End: 2024-03-01

## 2024-02-28 RX ORDER — IBUPROFEN 200 MG
600 TABLET ORAL EVERY 6 HOURS
Refills: 0 | Status: DISCONTINUED | OUTPATIENT
Start: 2024-02-28 | End: 2024-03-01

## 2024-02-28 RX ORDER — DIBUCAINE 1 %
1 OINTMENT (GRAM) RECTAL
Qty: 0 | Refills: 0 | DISCHARGE
Start: 2024-02-28

## 2024-02-28 RX ORDER — TETANUS TOXOID, REDUCED DIPHTHERIA TOXOID AND ACELLULAR PERTUSSIS VACCINE, ADSORBED 5; 2.5; 8; 8; 2.5 [IU]/.5ML; [IU]/.5ML; UG/.5ML; UG/.5ML; UG/.5ML
0.5 SUSPENSION INTRAMUSCULAR ONCE
Refills: 0 | Status: DISCONTINUED | OUTPATIENT
Start: 2024-02-28 | End: 2024-03-01

## 2024-02-28 RX ORDER — KETOROLAC TROMETHAMINE 30 MG/ML
30 SYRINGE (ML) INJECTION ONCE
Refills: 0 | Status: DISCONTINUED | OUTPATIENT
Start: 2024-02-28 | End: 2024-02-28

## 2024-02-28 RX ORDER — MAGNESIUM HYDROXIDE 400 MG/1
30 TABLET, CHEWABLE ORAL
Refills: 0 | Status: DISCONTINUED | OUTPATIENT
Start: 2024-02-28 | End: 2024-03-01

## 2024-02-28 RX ORDER — LANOLIN
1 OINTMENT (GRAM) TOPICAL EVERY 6 HOURS
Refills: 0 | Status: DISCONTINUED | OUTPATIENT
Start: 2024-02-28 | End: 2024-03-01

## 2024-02-28 RX ORDER — ACETAMINOPHEN 500 MG
3 TABLET ORAL
Qty: 0 | Refills: 0 | DISCHARGE
Start: 2024-02-28

## 2024-02-28 RX ORDER — SODIUM CHLORIDE 9 MG/ML
3 INJECTION INTRAMUSCULAR; INTRAVENOUS; SUBCUTANEOUS EVERY 8 HOURS
Refills: 0 | Status: DISCONTINUED | OUTPATIENT
Start: 2024-02-28 | End: 2024-03-01

## 2024-02-28 RX ORDER — OXYCODONE HYDROCHLORIDE 5 MG/1
5 TABLET ORAL ONCE
Refills: 0 | Status: DISCONTINUED | OUTPATIENT
Start: 2024-02-28 | End: 2024-03-01

## 2024-02-28 RX ORDER — AER TRAVELER 0.5 G/1
1 SOLUTION RECTAL; TOPICAL EVERY 4 HOURS
Refills: 0 | Status: DISCONTINUED | OUTPATIENT
Start: 2024-02-28 | End: 2024-03-01

## 2024-02-28 RX ADMIN — Medication 2 MILLIUNIT(S)/MIN: at 08:18

## 2024-02-28 RX ADMIN — Medication 975 MILLIGRAM(S): at 20:32

## 2024-02-28 RX ADMIN — Medication 30 MILLIGRAM(S): at 13:14

## 2024-02-28 RX ADMIN — Medication 975 MILLIGRAM(S): at 21:02

## 2024-02-28 RX ADMIN — SODIUM CHLORIDE 125 MILLILITER(S): 9 INJECTION, SOLUTION INTRAVENOUS at 08:18

## 2024-02-28 RX ADMIN — SODIUM CHLORIDE 3 MILLILITER(S): 9 INJECTION INTRAMUSCULAR; INTRAVENOUS; SUBCUTANEOUS at 22:06

## 2024-02-28 RX ADMIN — Medication 125 MILLIUNIT(S)/MIN: at 13:07

## 2024-02-28 NOTE — CHART NOTE - NSCHARTNOTEFT_GEN_A_CORE
patient was evaluated at bedside for variable decelerations   ve: 8/70/-2 IUPC placed   start amnio infusion  continue Pitocin   anticipate vaginal delivery

## 2024-02-28 NOTE — DISCHARGE NOTE OB - CARE PLAN
1 Principal Discharge DX:	Spontaneous vaginal delivery  Assessment and plan of treatment:	Routine pp care

## 2024-02-28 NOTE — CHART NOTE - NSCHARTNOTEFT_GEN_A_CORE
pt was evaluated at bedside   ve: 5/70/-2 AROM clear   continue pit augmentation   anticipate vaginal delivery

## 2024-02-28 NOTE — OB PROVIDER H&P - HISTORY OF PRESENT ILLNESS
Pt is a 32y/o  at 37w1d with PNC c/b gHTN presenting for scheduled IOL  Pt reports intermittent ctx, denies VB, LOF, FM felt.   GBS neg  EFW 3400g (extrapolated from 3175g  by steff)    OBHx:   -  FT VAVD PROM IOL, 8#  -  FT  PROM IOL 7#  -  FT  gHTN IOL @37w, 6#  GynHx: denies  PMHx: denies  PSHx: denies  Med: ASA 162mg qd, PNV, Vitamin D  All: NKDA  SH: denies a/d/t  Psych: denies

## 2024-02-28 NOTE — OB NEONATOLOGY/PEDIATRICIAN DELIVERY SUMMARY - NSPEDSNEONOTESA_OBGYN_ALL_OB_FT
Pediatrician called to delivery for category II tracing, terminal meconium. LGA Female infant born at 37+1 wks via  to a 31 y/o  blood type O+ mother. IOL for gHTN. No significant maternal or prenatal. Mom on ASA, vitD, PNV during pregnancy. EOS score 0.15, highest maternal temperature 37.2. Baby emerged vigorous. Cord clamping delayed x 1 min. Infant was brought to radiant warmer and warmed, dried, stimulated and suctioned. APGARS 8 (color)/9. Mom is initiating breast feeding. Consents to Hepatitis B vaccination. Admitted under Dr. Watkins. Exam notable for ear pits b/l, mom and dad both have single ear pit.    BW: 3053g  : 24  TOB: 11:33    Gen: NAD; well-appearing  HEENT: NC/AT; AFOF; ears and nose clinically patent, normally set; single ear pit b/l; oropharynx clear  Skin: pink, warm, well-perfused; erythematous patches over eyelids b/l  Resp: intermittent tachypnea; even, non-labored breathing  Abd: soft, nontender; umbilicus c/d/I, 3 vessels  Extremities: FROM; no crepitus  : Rodney I female; no abnormalities; anus patent  Neuro: +barbie, grasp, Babinski; good tone throughout

## 2024-02-28 NOTE — DISCHARGE NOTE OB - NS AS DC FOLLOWUP INST YN
Freda has been working with [facility] supervisor Aleta Tate.  Iker listed as palliative.  [facility] told Freda that they need more details on how to help patient.    Patient is wheelchair .  Uses walker for short distance.  Periodically she needs help with patient when she is absent to assist patient.      Patient also needs a nurse practitioner.  Freda did not want to go into detail.    Save message for Dr Dong  
They have very positive things to say about Zaheer Read.  Concern that lower abdominal pannus is pushing on the abraham catheter.  Wondering about some kind of corset to support abdominal girth.  I'm not sure how feasible this would be.  We had discussion about bariatric surgery but he's not enthusiastic about this.  Will be seeing urology tomorrow.  ?suprapubic catheter.    Overall his main issue is the supermorbid obesity.  It may be worth reinvestigating bariatric options.  
Yes

## 2024-02-28 NOTE — OB PROVIDER LABOR PROGRESS NOTE - NS_SUBJECTIVE/OBJECTIVE_OBGYN_ALL_OB_FT
R1 OB Labor Note    S: Patient seen and examined at bedside.     T(C): 36.9 (02-27-24 @ 23:27), Max: 36.9 (02-27-24 @ 22:59)  HR: 79 (02-28-24 @ 01:56) (79 - 92)  BP: 127/73 (02-28-24 @ 01:56) (127/73 - 129/72)  BP(mean): --  ABP: --  ABP(mean): --  RR: 16 (02-27-24 @ 23:27) (16 - 16)  SpO2: --  Wt(kg): --  CVP(mm Hg): --  CI: --  CAPILLARY BLOOD GLUCOSE       N/A

## 2024-02-28 NOTE — OB PROVIDER DELIVERY SUMMARY - NSPROVIDERDELIVERYNOTE_OBGYN_ALL_OB_FT
cat 2 tracing, peds present, GHTN    Spontaneous vaginal delivery of viable liveborn female infant.   Head, shoulders, and body delivered without complications. true knot x 1 noted, terminal meconium noted.   Infant was suctioned and passed to mother.   Delayed cord clamping performed, then clamped and cut.   Placenta delivered intact with a 3-vessel cord.   Fundal massage was given and uterine fundus was noted to be firm.   Vaginal exam revealed an intact cervix, vaginal walls, and sulci.   Patient has a 2nd degree laceration that was repaired with 2-0 chromic suture.   Excellent hemostasis noted.  Patient was stable and started postpartum recovery in room.   Count was correct x 2.     Infant: female  Apgar: 8/9  EBL: 151ml

## 2024-02-28 NOTE — OB PROVIDER LABOR PROGRESS NOTE - ASSESSMENT
CNM OB Progress Note    Patient seen and evaluated at bedside.  Denies complaints.  Comfortable w/ anesthesia epidural.      T(C): 36.9 (02-28-24 @ 05:00), Max: 36.9 (02-27-24 @ 22:59)  HR: 103 (02-28-24 @ 07:44) (78 - 110)  BP: 128/67 (02-28-24 @ 07:32) (114/58 - 143/76)  RR: 16 (02-28-24 @ 05:00) (14 - 16)  SpO2: 100% (02-28-24 @ 07:44) (95% - 100%)    SVE: 5/70/-3, asynclitic      -Labor: cat 1     -Analgesia: anesthesia epidural  -Induction with: BC and CB    CB expelled at 616am  Due for next dos eBC now, plan is to switch to pitocin  Pitocin to be started at 2u  reposition PRN  use peanutball to allow fetal head descent    -Continue to monitor with EFM & TOCO  -Recheck patient in 2-4 hours or PRN    Discussed with MD Tobias Arroyo  
A/P: 34y/o  @37w1d IOL for gHTN   - Labor: BC#1@1240a, CB@3a  - Fetus: cat 1  - GBS: neg  - Pain: minimal    CB placed. Continue buccal cytotec.     Viry Vazquez, PGY1  d/w Dr. Lopez

## 2024-02-28 NOTE — OB RN DELIVERY SUMMARY - NS_SEPSISRSKCALC_OBGYN_ALL_OB_FT
EOS calculated successfully. EOS Risk Factor: 0.5/1000 live births ([facility] national incidence); GA=37w1d; Temp=98.42; ROM=0.867; GBS='Negative'; Antibiotics='No antibiotics or any antibiotics < 2 hrs prior to birth'

## 2024-02-28 NOTE — OB PROVIDER H&P - NSHPPHYSICALEXAM_GEN_ALL_CORE
VS: Vital Signs Last 24 Hrs  T(C): 36.9 (27 Feb 2024 23:27), Max: 36.9 (27 Feb 2024 22:59)  T(F): 98.4 (27 Feb 2024 23:27), Max: 98.42 (27 Feb 2024 22:59)  HR: 92 (27 Feb 2024 23:27) (92 - 92)  BP: 129/72 (27 Feb 2024 23:27) (129/72 - 129/72)  BP(mean): --  RR: 16 (27 Feb 2024 23:27) (16 - 16)  SpO2: --    Parameters below as of 27 Feb 2024 23:27  Patient On (Oxygen Delivery Method): room air      GA: appears comfortable  Cards: RRR  Pulm: CTAB  Abdomen: gravid  SVE: 0.5/0/-3    TAUS: vtx    EFH: 145/mod/+accels/-decels  [place]: intermittent

## 2024-02-28 NOTE — OB PROVIDER H&P - NSLOWPPHRISK_OBGYN_A_OB
No previous uterine incision/[place] Pregnancy/Less than or equal to 4 previous vaginal births/No known bleeding disorder/No history of postpartum hemorrhage/No other PPH risks indicated

## 2024-02-28 NOTE — OB RN DELIVERY SUMMARY - NSSELHIDDEN_OBGYN_ALL_OB_FT
[NS_DeliveryAttending1_OBGYN_ALL_OB_FT:[email or web address]=],[NS_DeliveryAssist1_OBGYN_ALL_OB_FT:[id number]==],[NS_DeliveryRN_OBGYN_ALL_OB_FT:[id number]=]

## 2024-02-28 NOTE — OB PROVIDER H&P - ASSESSMENT
A&P:   Pt is a 32y/o  at 37w1d with PNC c/b gHTN presenting for scheduled IOL  - Admit to labor and delivery  - Labs, IVF, EFM,   - HELLP labs    - Begin induction with Buccal Cytotec and Cervical Balloon    d/w Dr. John Vazquez, PGY1

## 2024-02-28 NOTE — OB RN DELIVERY SUMMARY - NS_VACUUMATTEMPT_OBGYN_ALL_OB
Final Anesthesia Post-op Assessment    Patient: Tae Cooper  Procedure(s) Performed: COLONOSCOPY WITH  BIOPSY  Anesthesia type: MAC    Vitals Value Taken Time   Temp 36.4 °C (97.6 °F) 12/22/21 1240   Pulse 57 12/22/21 1240   Resp 13 12/22/21 1240   SpO2 99 % 12/22/21 1240   /82 12/22/21 1240         Patient Location: Phase II  Post-op Vital Signs:stable  Level of Consciousness: participates in exam, awake, oriented and alert  Respiratory Status: spontaneous ventilation  Cardiovascular stable  Hydration: euvolemic  Pain Management: well controlled  Vomiting: none  Nausea: None  Airway Patency:patent  Post-op Assessment: awake, alert, appropriately conversant, or baseline, no complications and patient tolerated procedure well with no complications      No complications documented.   
Vacuum Extraction was not used

## 2024-02-28 NOTE — DISCHARGE NOTE OB - CARE PROVIDER_API CALL
Arelis Collado  Obstetrics and Gynecology  [address] [place] [id number]  Phone: [phone]  Fax: [phone]  Follow Up Time:

## 2024-02-28 NOTE — DISCHARGE NOTE OB - ADDITIONAL INSTRUCTIONS
Follow up @ [facility] office in 7days for PP BP Check  Monitor BP @ home 3 times daily (morning/noon/night)  keep a strict blood pressure log and bring to the office 5-7 days after hospital discharge for review  if you have BP > 160/100 call the office for further advise  if you have headaches, vision changes, upper abdominal pain call the office to notify and go to [facility] Triage for evaluation

## 2024-02-28 NOTE — DISCHARGE NOTE OB - CARE PROVIDERS DIRECT ADDRESSES
[email or web address]

## 2024-02-28 NOTE — OB PROVIDER IHI INDUCTION/AUGMENTATION NOTE - NSNOTECOMPLETE_OBGYN_ALL_OB
Per the order of Dr. Abelardo Rivera, patient has been scheduled for exam under anesthesia  on 5.7. 19. Patient provided with surgery information during office visit. Patient instructed to please contact our office with any questions. Patient scheduled for post op follow up on 5.15.19    Phone call placed to surgery scheduling to schedule surgery, I spoke with [facility]. Surgery is to be the first case of the day for Dr. Abelardo Rivera. Dr. Abelardo Rivera to enter orders. Chart forwarded to RN to check if pre cert is required . Yes

## 2024-02-28 NOTE — OB PROVIDER DELIVERY SUMMARY - NSSELHIDDEN_OBGYN_ALL_OB_FT
[NS_DeliveryAttending1_OBGYN_ALL_OB_FT:[email or web address]=],[NS_DeliveryAssist1_OBGYN_ALL_OB_FT:[id number]==]

## 2024-02-28 NOTE — DISCHARGE NOTE OB - MATERIALS PROVIDED
[facility] [place] Screening Program/[place]  Immunization Record/Breastfeeding Log/Breastfeeding Mother’s Support Group Information/Guide to Postpartum Care/[facility] Hearing Screen Program/Back To Sleep Handout/Shaken Baby Prevention Handout/Breastfeeding Guide and Packet/Birth Certificate Instructions/Discharge Medication Information for Patients and Families Pocket Guide

## 2024-02-28 NOTE — OB PROVIDER H&P - PATIENT'S GENDER IDENTITY
Sedation Post Procedure Note    POST SEDATION ASSESSMENT      Patient:  Job Doty   :  1967  Medical Record No.:  [id number]   Date:  2018  Physician:  Ani Borrego M.D.       Patient location: Recovery  Level of consciousness: Awake, Alert, Oriented  Pain Control: Good  Respiration: Adequate  Post-op assessment: No sedation complications    Last Vitals:   Vitals:    18 1055   BP: 136/84   Pulse: 66   Resp: 16   Temp:    SpO2: 97%     Post-op Vitals: Stable    F Ronald Rist  11:04 AM Female

## 2024-02-28 NOTE — DISCHARGE NOTE OB - PATIENT PORTAL LINK FT
You can access the FollowMyHealth Patient Portal offered by [facility] by registering at the following website: [email or web address]. By joining [employer]’s FollowMyHealth portal, you will also be able to view your health information using other applications (apps) compatible with our system.

## 2024-02-28 NOTE — DISCHARGE NOTE OB - NS MD DC FALL RISK RISK
For information on Fall & Injury Prevention, visit: [email or web address] OR  [email or web address] OR  https://www.cdc.gov/steadi/patient.html

## 2024-02-29 RX ADMIN — Medication 975 MILLIGRAM(S): at 21:30

## 2024-02-29 RX ADMIN — Medication 975 MILLIGRAM(S): at 16:10

## 2024-02-29 RX ADMIN — Medication 975 MILLIGRAM(S): at 03:32

## 2024-02-29 RX ADMIN — SODIUM CHLORIDE 3 MILLILITER(S): 9 INJECTION INTRAMUSCULAR; INTRAVENOUS; SUBCUTANEOUS at 06:37

## 2024-02-29 RX ADMIN — Medication 600 MILLIGRAM(S): at 00:19

## 2024-02-29 RX ADMIN — Medication 975 MILLIGRAM(S): at 15:39

## 2024-02-29 RX ADMIN — Medication 600 MILLIGRAM(S): at 00:49

## 2024-02-29 RX ADMIN — Medication 600 MILLIGRAM(S): at 19:03

## 2024-02-29 RX ADMIN — Medication 975 MILLIGRAM(S): at 03:02

## 2024-02-29 RX ADMIN — Medication 975 MILLIGRAM(S): at 20:51

## 2024-02-29 RX ADMIN — SODIUM CHLORIDE 3 MILLILITER(S): 9 INJECTION INTRAMUSCULAR; INTRAVENOUS; SUBCUTANEOUS at 20:58

## 2024-02-29 NOTE — PROGRESS NOTE ADULT - ASSESSMENT
Assessment and Plan  PPD # 1 s/p . GHTN    GHTN#  -BP x 24 hours: BP:  (111/65 - 143/81)  HELLP wnl. PC Ratio 0.2  Currently not on any HTN meds  Denies PEC s/s  Continue to monitor    Her pain is well controlled.   She is tolerating a regular diet and passing flatus.   Denies N/V. Denies CP/SOB/lightheadedness/dizziness.   She is ambulating without difficulty.   Voiding spontaneously.    Patient is stable and doing well postpartum  - Continue regular diet.  - Increase ambulation.  - Continue motrin, tylenol, oxycodone PRN for pain control.   -Encourage breastfeeding.    -PP educational material reviewed and provided.  -PP & PPD Instructions reviewed.    -Discharge planning>>>D/C home tomorrow    Follow up @ [facility] office in 7days for PP BP Check  Monitor BP @ home 3 times daily (morning/noon/night)  keep a strict blood pressure log and bring to the office 5-7 days after hospital discharge for review  if you have BP > 160/100 call the office for further advise  if you have headaches, vision changes, upper abdominal pain call the office to notify and go to [facility] Triage for evaluation    Discussed with MD Tobias SHEN

## 2024-03-01 VITALS
TEMPERATURE: 98 F | DIASTOLIC BLOOD PRESSURE: 68 MMHG | HEART RATE: 90 BPM | RESPIRATION RATE: 17 BRPM | SYSTOLIC BLOOD PRESSURE: 122 MMHG | OXYGEN SATURATION: 100 %

## 2024-03-01 RX ADMIN — Medication 975 MILLIGRAM(S): at 08:27

## 2024-03-01 RX ADMIN — Medication 1 TABLET(S): at 11:32

## 2024-03-01 RX ADMIN — Medication 975 MILLIGRAM(S): at 09:20

## 2024-03-01 RX ADMIN — Medication 600 MILLIGRAM(S): at 12:30

## 2024-03-01 RX ADMIN — Medication 600 MILLIGRAM(S): at 11:32

## 2024-03-01 NOTE — PROGRESS NOTE ADULT - SUBJECTIVE AND OBJECTIVE BOX
Post-partum Note,   She is a 33y woman who is now post-partum day: 1    Subjective:  The patient feels well.  She is ambulating.   She is tolerating regular diet.  She denies nausea and vomiting; denies fever.  She is voiding.  Her pain is controlled.  She reports normal postpartum bleeding.  She is breastfeeding.  She is formula feeding.  She is bonding with infant.    Physical exam:    Vital Signs Last 24 Hrs  T(C): 36.6 (2024 14:10), Max: 36.9 (2024 17:00)  T(F): 97.8 (2024 14:10), Max: 98.4 (2024 17:00)  HR: 91 (2024 14:10) (82 - 99)  BP: 111/65 (2024 14:10) (111/65 - 143/81)  BP(mean): --  RR: 18 (2024 14:10) (17 - 18)  SpO2: 98% (2024 14:10) (98% - 100%)    Parameters below as of 2024 14:10  Patient On (Oxygen Delivery Method): room air        Gen: NAD  Breast: Soft, nontender, not engorged.  Abdomen: Soft, nontender, no distension , firm uterine fundus at umbilicus.  Pelvic: Normal lochia noted  Ext: No calf tenderness    LABS:                        12.8   10.12 )-----------( 233      ( 2024 23:00 )             37.9       Rubella status:     Allergies    No Known Allergies    Intolerances      MEDICATIONS  (STANDING):  acetaminophen     Tablet .. 975 milliGRAM(s) Oral <User Schedule>  diphtheria/tetanus/pertussis (acellular) Vaccine (Adacel) 0.5 milliLiter(s) IntraMuscular once  ibuprofen  Tablet. 600 milliGRAM(s) Oral every 6 hours  oxytocin Infusion 41.667 milliUNIT(s)/Min (125 mL/Hr) IV Continuous <Continuous>  oxytocin Infusion. 2 milliUNIT(s)/Min (2 mL/Hr) IV Continuous <Continuous>  prenatal multivitamin 1 Tablet(s) Oral daily  sodium chloride 0.9% Bolus 300 milliLiter(s) Apheresis Circuit.. once  sodium chloride 0.9% lock flush 3 milliLiter(s) IV Push every 8 hours  sodium chloride 0.9%. 1000 milliLiter(s) (125 mL/Hr) IV Continuous <Continuous>    MEDICATIONS  (PRN):  benzocaine 20%/menthol 0.5% Spray 1 Spray(s) Topical every 6 hours PRN for Perineal discomfort  dibucaine 1% Ointment 1 Application(s) Topical every 6 hours PRN Perineal discomfort  diphenhydrAMINE 25 milliGRAM(s) Oral every 6 hours PRN Pruritus  hydrocortisone 1% Cream 1 Application(s) Topical every 6 hours PRN Moderate Pain (4-6)  lanolin Ointment 1 Application(s) Topical every 6 hours PRN nipple soreness  magnesium hydroxide Suspension 30 milliLiter(s) Oral two times a day PRN Constipation  oxyCODONE    IR 5 milliGRAM(s) Oral every 3 hours PRN Moderate to Severe Pain (4-10)  oxyCODONE    IR 5 milliGRAM(s) Oral once PRN Moderate to Severe Pain (4-10)  pramoxine 1%/zinc 5% Cream 1 Application(s) Topical every 4 hours PRN Moderate Pain (4-6)  simethicone 80 milliGRAM(s) Chew every 4 hours PRN Gas  witch hazel Pads 1 Application(s) Topical every 4 hours PRN Perineal discomfort        Assessment and Plan  PPD #1 s/p     Doing well postpartum  Increase ambulation.  Encourage breastfeeding.  Continue taking PO pain meds PRN    PP & PPD Instructions reviewed.  PP educational materials reviewed & provided     - Discharge home with instructions  -Follow up in office in 5-6 weeks for postpartum visit      Discussed with MD Tobias Rahman SOFIA  [phone]      
Anesthesia Post-op Note    POD#1 S/P vaginal delivery    Patient is doing well.  OOBAA. Tolerating clears.  Pain is tolerable.  No residual anesthetic issues or complications noted.    Carlos Vazquez CRNA
Post-partum Note,   She is a  33y woman who is now post-partum day: 2    Subjective:  The patient feels well.  She is ambulating.   She is tolerating regular diet.  She denies nausea and vomiting; denies fever.  She is voiding.  Her pain is controlled.  She reports normal postpartum bleeding.  She is breastfeeding.  She is formula feeding.  She is bonding with infant.    Physical exam:    Vital Signs Last 24 Hrs  T(C): 36.8 (01 Mar 2024 09:16), Max: 36.8 (01 Mar 2024 01:33)  T(F): 98.3 (01 Mar 2024 09:16), Max: 98.3 (01 Mar 2024 09:16)  HR: 93 (01 Mar 2024 09:16) (76 - 93)  BP: 136/80 (01 Mar 2024 09:16) (111/65 - 136/80)  BP(mean): --  RR: 18 (01 Mar 2024 09:16) (17 - 18)  SpO2: 99% (01 Mar 2024 09:16) (98% - 100%)    Parameters below as of 01 Mar 2024 05:42  Patient On (Oxygen Delivery Method): room air        Gen: NAD  Breast: Soft, nontender, not engorged.  Abdomen: Soft, nontender, no distension , firm uterine fundus at umbilicus.  Pelvic: Normal lochia noted  Ext: No calf tenderness    LABS:      Rubella status:     Allergies    No Known Allergies    Intolerances      MEDICATIONS  (STANDING):  acetaminophen     Tablet .. 975 milliGRAM(s) Oral <User Schedule>  diphtheria/tetanus/pertussis (acellular) Vaccine (Adacel) 0.5 milliLiter(s) IntraMuscular once  ibuprofen  Tablet. 600 milliGRAM(s) Oral every 6 hours  oxytocin Infusion 41.667 milliUNIT(s)/Min (125 mL/Hr) IV Continuous <Continuous>  oxytocin Infusion. 2 milliUNIT(s)/Min (2 mL/Hr) IV Continuous <Continuous>  prenatal multivitamin 1 Tablet(s) Oral daily  sodium chloride 0.9% Bolus 300 milliLiter(s) Apheresis Circuit.. once  sodium chloride 0.9% lock flush 3 milliLiter(s) IV Push every 8 hours  sodium chloride 0.9%. 1000 milliLiter(s) (125 mL/Hr) IV Continuous <Continuous>    MEDICATIONS  (PRN):  benzocaine 20%/menthol 0.5% Spray 1 Spray(s) Topical every 6 hours PRN for Perineal discomfort  dibucaine 1% Ointment 1 Application(s) Topical every 6 hours PRN Perineal discomfort  diphenhydrAMINE 25 milliGRAM(s) Oral every 6 hours PRN Pruritus  hydrocortisone 1% Cream 1 Application(s) Topical every 6 hours PRN Moderate Pain (4-6)  lanolin Ointment 1 Application(s) Topical every 6 hours PRN nipple soreness  magnesium hydroxide Suspension 30 milliLiter(s) Oral two times a day PRN Constipation  oxyCODONE    IR 5 milliGRAM(s) Oral every 3 hours PRN Moderate to Severe Pain (4-10)  oxyCODONE    IR 5 milliGRAM(s) Oral once PRN Moderate to Severe Pain (4-10)  pramoxine 1%/zinc 5% Cream 1 Application(s) Topical every 4 hours PRN Moderate Pain (4-6)  simethicone 80 milliGRAM(s) Chew every 4 hours PRN Gas  witch hazel Pads 1 Application(s) Topical every 4 hours PRN Perineal discomfort        Assessment and Plan  PPD #2 s/p   Doing well.  Increase ambulation.  Encourage breastfeeding.  PP & PPD Instructions reviewed.  PP educational materials reviewed & provided       GHTN  -BPs normal since delivery  -Has Blood pressure cuff at home; to take TID and call with parameters given 140/90 to call office and 160/110 to report to hosp  follow up in 1 week     D/C home today     MD Long

## 2024-03-02 ENCOUNTER — TRANSCRIPTION ENCOUNTER (OUTPATIENT)
Age: 34
End: 2024-03-02

## 2024-03-04 ENCOUNTER — NON-APPOINTMENT (OUTPATIENT)
Age: 34
End: 2024-03-04

## 2024-03-13 ENCOUNTER — NON-APPOINTMENT (OUTPATIENT)
Age: 34
End: 2024-03-13

## 2024-03-17 ENCOUNTER — NON-APPOINTMENT (OUTPATIENT)
Age: 34
End: 2024-03-17

## 2024-03-22 ENCOUNTER — APPOINTMENT (OUTPATIENT)
Dept: CARDIOLOGY | Facility: CLINIC | Age: 34
End: 2024-03-22
Payer: COMMERCIAL

## 2024-03-22 ENCOUNTER — NON-APPOINTMENT (OUTPATIENT)
Age: 34
End: 2024-03-22

## 2024-03-22 VITALS
HEART RATE: 77 BPM | BODY MASS INDEX: 31.65 KG/M2 | HEIGHT: 65 IN | SYSTOLIC BLOOD PRESSURE: 112 MMHG | WEIGHT: 190 LBS | OXYGEN SATURATION: 96 % | DIASTOLIC BLOOD PRESSURE: 71 MMHG

## 2024-03-22 PROCEDURE — 93000 ELECTROCARDIOGRAM COMPLETE: CPT

## 2024-03-22 PROCEDURE — 99213 OFFICE O/P EST LOW 20 MIN: CPT | Mod: 25

## 2024-03-22 NOTE — HISTORY OF PRESENT ILLNESS
[FreeTextEntry1] : 33 year old woman  who delivered on 24 at 37.6 weeks With this pregnancy- she was sent home and 2 days later-->PEC- started on Nifedipine   She states she had no issues with first pregnancy, with second pregnancy- at the end-post partum had elevated BP but no meds and normalized quickly, 3rd pregnancy () delivered at 37  weeks- GHTN- put on Nifedipine 30 mg daily; took meds for about 1 month  TTE normal

## 2024-03-22 NOTE — PHYSICAL EXAM
[Well Developed] : well developed [No Acute Distress] : no acute distress [Well Nourished] : well nourished [Normal Conjunctiva] : normal conjunctiva [Normal Venous Pressure] : normal venous pressure [No Carotid Bruit] : no carotid bruit [Normal S1, S2] : normal S1, S2 [No Murmur] : no murmur [No Rub] : no rub [No Gallop] : no gallop [Good Air Entry] : good air entry [Clear Lung Fields] : clear lung fields [Soft] : abdomen soft [No Respiratory Distress] : no respiratory distress  [Non Tender] : non-tender [Normal Bowel Sounds] : normal bowel sounds [No Masses/organomegaly] : no masses/organomegaly [Normal Gait] : normal gait [No Edema] : no edema [No Cyanosis] : no cyanosis [No Clubbing] : no clubbing [No Varicosities] : no varicosities [No Rash] : no rash [No Skin Lesions] : no skin lesions [Moves all extremities] : moves all extremities [No Focal Deficits] : no focal deficits [Normal Speech] : normal speech [Alert and Oriented] : alert and oriented [Normal memory] : normal memory

## 2024-03-22 NOTE — DISCUSSION/SUMMARY
[FreeTextEntry1] : 33 year old woman  who delivered on 24 at 37.6 weeks With this pregnancy- she was sent home and 2 days later-->PEC- started on Nifedipine Between pregnancies normotensive On  mg daily Can hold Nifedipine if BP<120/80 Will call me in 2 weeks  [EKG obtained to assist in diagnosis and management of assessed problem(s)] : EKG obtained to assist in diagnosis and management of assessed problem(s)

## 2024-03-27 ENCOUNTER — NON-APPOINTMENT (OUTPATIENT)
Age: 34
End: 2024-03-27

## 2024-07-23 ENCOUNTER — NON-APPOINTMENT (OUTPATIENT)
Age: 34
End: 2024-07-23

## 2024-07-23 ENCOUNTER — APPOINTMENT (OUTPATIENT)
Dept: CARDIOLOGY | Facility: CLINIC | Age: 34
End: 2024-07-23
Payer: COMMERCIAL

## 2024-07-23 VITALS
SYSTOLIC BLOOD PRESSURE: 136 MMHG | BODY MASS INDEX: 28.99 KG/M2 | WEIGHT: 174 LBS | HEIGHT: 65 IN | RESPIRATION RATE: 16 BRPM | HEART RATE: 85 BPM | DIASTOLIC BLOOD PRESSURE: 87 MMHG

## 2024-07-23 DIAGNOSIS — R03.0 ELEVATED BLOOD-PRESSURE READING, W/OUT DIAGNOSIS OF HYPERTENSION: ICD-10-CM

## 2024-07-23 PROCEDURE — 93000 ELECTROCARDIOGRAM COMPLETE: CPT

## 2024-07-23 PROCEDURE — 99214 OFFICE O/P EST MOD 30 MIN: CPT | Mod: 25

## 2024-07-23 NOTE — DISCUSSION/SUMMARY
[FreeTextEntry1] : 33 year old woman  who delivered on 24 at 37.6 weeks With this pregnancy- she was sent home and 2 days later-->PEC- started on Nifedipine Between pregnancies normotensive BP slightly elevated Will continue monitor  FU in 2 months  [EKG obtained to assist in diagnosis and management of assessed problem(s)] : EKG obtained to assist in diagnosis and management of assessed problem(s)

## 2024-07-26 RX ORDER — NIFEDIPINE 30 MG/1
30 TABLET, FILM COATED, EXTENDED RELEASE ORAL DAILY
Qty: 1 | Refills: 3 | Status: ACTIVE | COMMUNITY
Start: 2024-03-02 | End: 1900-01-01

## 2024-09-05 NOTE — ED ADULT NURSE NOTE - CHIEF COMPLAINT
Patient to be discharged 9.5.24 with maximum potential met in home and nursing still active in case.Patient instructed in signs and symptoms of infection, when to call MD or 911, fall prevention, safe progression of WHEP, safety with household transfers and ambulation with assistive device. The patient is a 28y Female complaining of

## 2024-09-23 ENCOUNTER — NON-APPOINTMENT (OUTPATIENT)
Age: 34
End: 2024-09-23

## 2024-09-24 ENCOUNTER — APPOINTMENT (OUTPATIENT)
Dept: CARDIOLOGY | Facility: CLINIC | Age: 34
End: 2024-09-24
Payer: COMMERCIAL

## 2024-09-24 ENCOUNTER — NON-APPOINTMENT (OUTPATIENT)
Age: 34
End: 2024-09-24

## 2024-09-24 VITALS
OXYGEN SATURATION: 98 % | SYSTOLIC BLOOD PRESSURE: 143 MMHG | WEIGHT: 173 LBS | BODY MASS INDEX: 28.82 KG/M2 | HEIGHT: 65 IN | DIASTOLIC BLOOD PRESSURE: 87 MMHG

## 2024-09-24 DIAGNOSIS — R07.89 OTHER CHEST PAIN: ICD-10-CM

## 2024-09-24 PROCEDURE — 99214 OFFICE O/P EST MOD 30 MIN: CPT | Mod: 25

## 2024-09-24 PROCEDURE — 93000 ELECTROCARDIOGRAM COMPLETE: CPT

## 2024-09-24 NOTE — HISTORY OF PRESENT ILLNESS
[FreeTextEntry1] : 33 year old woman  who delivered on 24 at 37.6 weeks. With this pregnancy- she was sent home and 2 days later-->PEC- started on Nifedipine She states she had no issues with first pregnancy, with second pregnancy- at the end-post partum had elevated BP but no meds and normalized quickly, 3rd pregnancy () delivered at 37  weeks- GHTN- put on Nifedipine 30 mg daily; took meds for about 1 month. Patient reports feels chest pressure / pinching left side relieved " come and goes " when she is able to calm herself down.   TTE normal BP at home 120s- 140s /70- 80s

## 2024-09-24 NOTE — END OF VISIT
Interdisciplinary team rounds were held 4/9/2019 with the following team members:Care Management, Nursing, Occupational Therapy and Physician and the patient. Plan of care discussed. See clinical pathway and/or care plan for interventions and desired outcomes.  
 
Pt to discharge to [facility] approval.  
 [Time Spent: ___ minutes] : I have spent [unfilled] minutes of time on the encounter which excludes teaching and separately reported services.

## 2024-09-24 NOTE — DISCUSSION/SUMMARY
[EKG obtained to assist in diagnosis and management of assessed problem(s)] : EKG obtained to assist in diagnosis and management of assessed problem(s) [FreeTextEntry1] : 33 year old woman  who delivered on 24 at 37.6 weeks pp course complicated by sPEC ( 2 days after discharge). Started on Nifedipine. Patient has been normotensive between pregnancies.   # HTN : BP slightly elevated. Continue Nifedipine 30 mg QD ( advised to hold if BP < 120/80)  Will continue monitor   # Chest pressure: Stress test to R/o ischemic etiology  FU in 2 months

## 2024-10-01 ENCOUNTER — NON-APPOINTMENT (OUTPATIENT)
Age: 34
End: 2024-10-01

## 2024-10-01 ENCOUNTER — APPOINTMENT (OUTPATIENT)
Dept: FAMILY MEDICINE | Facility: CLINIC | Age: 34
End: 2024-10-01
Payer: COMMERCIAL

## 2024-10-01 VITALS — DIASTOLIC BLOOD PRESSURE: 86 MMHG | SYSTOLIC BLOOD PRESSURE: 132 MMHG

## 2024-10-01 VITALS
HEART RATE: 98 BPM | BODY MASS INDEX: 28.32 KG/M2 | DIASTOLIC BLOOD PRESSURE: 96 MMHG | HEIGHT: 65 IN | SYSTOLIC BLOOD PRESSURE: 140 MMHG | OXYGEN SATURATION: 98 % | TEMPERATURE: 98 F | WEIGHT: 170 LBS

## 2024-10-01 DIAGNOSIS — Z13.0 ENCOUNTER FOR SCREENING FOR OTHER SUSPECTED ENDOCRINE DISORDER: ICD-10-CM

## 2024-10-01 DIAGNOSIS — Z13.228 ENCOUNTER FOR SCREENING FOR OTHER SUSPECTED ENDOCRINE DISORDER: ICD-10-CM

## 2024-10-01 DIAGNOSIS — Z13.220 ENCOUNTER FOR SCREENING FOR LIPOID DISORDERS: ICD-10-CM

## 2024-10-01 DIAGNOSIS — Z13.29 ENCOUNTER FOR SCREENING FOR OTHER SUSPECTED ENDOCRINE DISORDER: ICD-10-CM

## 2024-10-01 DIAGNOSIS — I10 ESSENTIAL (PRIMARY) HYPERTENSION: ICD-10-CM

## 2024-10-01 DIAGNOSIS — Z00.00 ENCOUNTER FOR GENERAL ADULT MEDICAL EXAMINATION W/OUT ABNORMAL FINDINGS: ICD-10-CM

## 2024-10-01 PROCEDURE — 99385 PREV VISIT NEW AGE 18-39: CPT

## 2024-10-01 NOTE — PHYSICAL EXAM
[No Acute Distress] : no acute distress [Well-Appearing] : well-appearing [Normal Sclera/Conjunctiva] : normal sclera/conjunctiva [EOMI] : extraocular movements intact [Normal Outer Ear/Nose] : the outer ears and nose were normal in appearance [Normal Oropharynx] : the oropharynx was normal [No Lymphadenopathy] : no lymphadenopathy [Supple] : supple [Thyroid Normal, No Nodules] : the thyroid was normal and there were no nodules present [No Respiratory Distress] : no respiratory distress  [Clear to Auscultation] : lungs were clear to auscultation bilaterally [Normal Rate] : normal rate  [Regular Rhythm] : with a regular rhythm [Normal S1, S2] : normal S1 and S2 [No Murmur] : no murmur heard [Soft] : abdomen soft [Non Tender] : non-tender [Non-distended] : non-distended [No Rash] : no rash [Coordination Grossly Intact] : coordination grossly intact [Normal Gait] : normal gait [Normal Affect] : the affect was normal [Normal Insight/Judgement] : insight and judgment were intact

## 2024-10-01 NOTE — REVIEW OF SYSTEMS
[Fever] : no fever [Chills] : no chills [Nasal Discharge] : no nasal discharge [Chest Pain] : no chest pain [Palpitations] : no palpitations [Shortness Of Breath] : no shortness of breath [Cough] : no cough [Abdominal Pain] : no abdominal pain [Nausea] : no nausea [Constipation] : no constipation [Diarrhea] : diarrhea [Vomiting] : no vomiting [Dysuria] : no dysuria [Frequency] : no frequency [Itching] : no itching [Skin Rash] : no skin rash [Headache] : no headache [Dizziness] : no dizziness

## 2024-10-01 NOTE — HEALTH RISK ASSESSMENT
[Very Good] : ~his/her~  mood as very good [No] : In the past 12 months have you used drugs other than those required for medical reasons? No [Patient reported PAP Smear was normal] : Patient reported PAP Smear was normal [With Family] : lives with family [Employed] : employed [] :  [# Of Children ___] : has [unfilled] children [Smoke Detector] : smoke detector [Carbon Monoxide Detector] : carbon monoxide detector [Seat Belt] :  uses seat belt [Sunscreen] : uses sunscreen [Never] : Never [de-identified] : just started running [de-identified] : low salt diet [Reports changes in hearing] : Reports no changes in hearing [Reports changes in vision] : Reports no changes in vision [Reports changes in dental health] : Reports no changes in dental health [PapSmearDate] : 01/23 [FreeTextEntry2] :

## 2024-10-01 NOTE — HEALTH RISK ASSESSMENT
[Very Good] : ~his/her~  mood as very good [No] : In the past 12 months have you used drugs other than those required for medical reasons? No [Patient reported PAP Smear was normal] : Patient reported PAP Smear was normal [With Family] : lives with family [Employed] : employed [] :  [# Of Children ___] : has [unfilled] children [Smoke Detector] : smoke detector [Carbon Monoxide Detector] : carbon monoxide detector [Seat Belt] :  uses seat belt [Sunscreen] : uses sunscreen [Never] : Never [de-identified] : just started running [de-identified] : low salt diet [Reports changes in hearing] : Reports no changes in hearing [Reports changes in vision] : Reports no changes in vision [Reports changes in dental health] : Reports no changes in dental health [PapSmearDate] : 01/23 [FreeTextEntry2] :

## 2024-10-01 NOTE — HISTORY OF PRESENT ILLNESS
[FreeTextEntry1] : CPE, establish care [de-identified] : 35yo female with PMH of HTN who presents to the office for annual physical examination and to establish care.   She follows with cardiology given history of postpartum HTN, with postpartum preeclampsia. She delivered her last child in 2/2024. She had HTN with her third and fourth children. First pregnancy she was induced at 37 weeks due to elevated blood pressure and blood pressure normalized after birth. During her last pregnancy, she was induced at 37 weeks as a precaution and then was admitted for postpartum PEC. She is concerned that her blood pressure is still elevated 7 months postpartum. Currently taking Nifedipine 30mg. Denies any headaches, dizziness, chest pain, palpitations with her elevated BP. She does note that she has anxiety, and will feel tingling in her body.

## 2024-10-01 NOTE — ASSESSMENT
[FreeTextEntry1] : #HCM - Patient presenting for annual physical exam - Declines flu vaccine today, will return later in season - Up to date with pap smear - Will check routine blood work today and call patient with results   #HTN - BP elevated on presentation, improved on repeat - Compliant with Nifedipine - Continue dietary and exercise modifications, avoid excessive salt intake, increase PO fluids - Follow up with cardiology

## 2024-10-01 NOTE — HISTORY OF PRESENT ILLNESS
[FreeTextEntry1] : CPE, establish care [de-identified] : 33yo female with PMH of HTN who presents to the office for annual physical examination and to establish care.   She follows with cardiology given history of postpartum HTN, with postpartum preeclampsia. She delivered her last child in 2/2024. She had HTN with her third and fourth children. First pregnancy she was induced at 37 weeks due to elevated blood pressure and blood pressure normalized after birth. During her last pregnancy, she was induced at 37 weeks as a precaution and then was admitted for postpartum PEC. She is concerned that her blood pressure is still elevated 7 months postpartum. Currently taking Nifedipine 30mg. Denies any headaches, dizziness, chest pain, palpitations with her elevated BP. She does note that she has anxiety, and will feel tingling in her body.

## 2024-10-02 ENCOUNTER — RESULT REVIEW (OUTPATIENT)
Age: 34
End: 2024-10-02

## 2024-10-02 LAB
ALBUMIN SERPL ELPH-MCNC: 4.5 G/DL
ALP BLD-CCNC: 62 U/L
ALT SERPL-CCNC: 8 U/L
ANION GAP SERPL CALC-SCNC: 15 MMOL/L
AST SERPL-CCNC: 13 U/L
BASOPHILS # BLD AUTO: 0.02 K/UL
BASOPHILS NFR BLD AUTO: 0.3 %
BILIRUB SERPL-MCNC: 0.2 MG/DL
BUN SERPL-MCNC: 13 MG/DL
CALCIUM SERPL-MCNC: 9.7 MG/DL
CHLORIDE SERPL-SCNC: 101 MMOL/L
CHOLEST SERPL-MCNC: 222 MG/DL
CO2 SERPL-SCNC: 23 MMOL/L
CREAT SERPL-MCNC: 0.65 MG/DL
EGFR: 119 ML/MIN/1.73M2
EOSINOPHIL # BLD AUTO: 0.11 K/UL
EOSINOPHIL NFR BLD AUTO: 1.5 %
ESTIMATED AVERAGE GLUCOSE: 111 MG/DL
GLUCOSE SERPL-MCNC: 85 MG/DL
HBA1C MFR BLD HPLC: 5.5 %
HCT VFR BLD CALC: 43.7 %
HDLC SERPL-MCNC: 66 MG/DL
HGB BLD-MCNC: 14 G/DL
IMM GRANULOCYTES NFR BLD AUTO: 0.3 %
LDLC SERPL CALC-MCNC: 143 MG/DL
LYMPHOCYTES # BLD AUTO: 2.98 K/UL
LYMPHOCYTES NFR BLD AUTO: 39.5 %
MAN DIFF?: NORMAL
MCHC RBC-ENTMCNC: 28.5 PG
MCHC RBC-ENTMCNC: 32 GM/DL
MCV RBC AUTO: 89 FL
MONOCYTES # BLD AUTO: 0.57 K/UL
MONOCYTES NFR BLD AUTO: 7.5 %
NEUTROPHILS # BLD AUTO: 3.85 K/UL
NEUTROPHILS NFR BLD AUTO: 50.9 %
NONHDLC SERPL-MCNC: 156 MG/DL
PLATELET # BLD AUTO: 336 K/UL
POTASSIUM SERPL-SCNC: 4.4 MMOL/L
PROT SERPL-MCNC: 7.6 G/DL
RBC # BLD: 4.91 M/UL
RBC # FLD: 13.2 %
SODIUM SERPL-SCNC: 139 MMOL/L
TRIGL SERPL-MCNC: 74 MG/DL
TSH SERPL-ACNC: 2.19 UIU/ML
WBC # FLD AUTO: 7.55 K/UL

## 2024-10-03 ENCOUNTER — APPOINTMENT (OUTPATIENT)
Dept: CV DIAGNOSTICS | Facility: HOSPITAL | Age: 34
End: 2024-10-03

## 2024-12-02 ENCOUNTER — APPOINTMENT (OUTPATIENT)
Dept: CARDIOLOGY | Facility: CLINIC | Age: 34
End: 2024-12-02

## 2024-12-09 NOTE — OB RN TRIAGE NOTE - SUICIDE SCREENING QUESTION 3
"    Caller: Filipe Altamirano \"Arslan\"    Relationship to patient: Self    Best call back number: [phone]    Patient is needing: PT WAS IN ED OVER THE WEEKEND WITH AFIB, WAS ADVISED TO FOLLOW UP WITH DR. CHATMAN HOWEVER NO ADVISEMENT ON SCHEDULING IN DISCHARGE NOTES. PLS CALL TO SCHEDULE.         "
Ov scheduled for afib  
No

## 2024-12-27 ENCOUNTER — APPOINTMENT (OUTPATIENT)
Dept: CARDIOLOGY | Facility: CLINIC | Age: 34
End: 2024-12-27
Payer: COMMERCIAL

## 2024-12-27 ENCOUNTER — NON-APPOINTMENT (OUTPATIENT)
Age: 34
End: 2024-12-27

## 2024-12-27 VITALS
HEART RATE: 92 BPM | HEIGHT: 65 IN | DIASTOLIC BLOOD PRESSURE: 89 MMHG | OXYGEN SATURATION: 100 % | BODY MASS INDEX: 28.32 KG/M2 | SYSTOLIC BLOOD PRESSURE: 138 MMHG | WEIGHT: 170 LBS

## 2024-12-27 DIAGNOSIS — R07.89 OTHER CHEST PAIN: ICD-10-CM

## 2024-12-27 DIAGNOSIS — I10 ESSENTIAL (PRIMARY) HYPERTENSION: ICD-10-CM

## 2024-12-27 PROCEDURE — 99214 OFFICE O/P EST MOD 30 MIN: CPT

## 2024-12-27 PROCEDURE — G2211 COMPLEX E/M VISIT ADD ON: CPT | Mod: NC

## 2024-12-27 PROCEDURE — 93000 ELECTROCARDIOGRAM COMPLETE: CPT

## 2024-12-27 RX ORDER — LOSARTAN POTASSIUM 50 MG/1
50 TABLET, FILM COATED ORAL
Qty: 90 | Refills: 3 | Status: ACTIVE | COMMUNITY
Start: 2024-12-27 | End: 1900-01-01

## 2025-02-14 NOTE — OB PROVIDER TRIAGE NOTE - NSPREVIOUSTERM_OBGYN_ALL_OB
PA tramadol (Ultram) 50 mg APPROVED         Patient advised by          []MyChart Message  []Phone call   []LMOM  []L/M to call office as no active Communication consent on file  []Unable to leave detailed message as VM not approved on Communication consent       Pharmacy advised by    [x]Fax  [x]Phone call    Specialty Pharmacy    []        
PA tramadol (Ultram) 50 mg SUBMITTED     to [facility]     via    []CMM-KEY:     [x]Surescripts-Case ID # [id number]  []Availity-Auth ID #  NDC #    []Faxed to plan   []Other website    []Phone call Case ID #      []PA sent as URGENT    All office notes, labs and other pertaining documents and studies sent. Clinical questions answered. Awaiting determination from insurance company.     Turnaround time for your insurance to make a decision on your Prior Authorization can take 7-21 business days.               
No

## 2025-02-18 NOTE — OB RN TRIAGE NOTE - NS_FETALHEARTRATE_OBGYN_ALL_OB_FT
Return call placed to patient's spouse, Cate. Spouse states that her daughter is getting  out of state in late March, and she would like for patient to stay in a facility for a short-term respite so she can travel to the wedding. She inquires if health insurance would cover a respite stay, and requests facility referrals.    LCSW explained that health insurance is unlikely to cover respite stay, though spouse can check with patient's policy. Advised that some respite funds may be available through the [place]; provided contact info for local care coordination unit ([facility]) and advised spouse to call as soon as possible to inquire. Offered memory care facility referrals, which spouse accepts via email to [email or web address]. She indicates no further questions at this time.    [email or web address]

## 2025-02-21 ENCOUNTER — APPOINTMENT (OUTPATIENT)
Dept: CARDIOLOGY | Facility: CLINIC | Age: 35
End: 2025-02-21
Payer: COMMERCIAL

## 2025-02-21 ENCOUNTER — NON-APPOINTMENT (OUTPATIENT)
Age: 35
End: 2025-02-21

## 2025-02-21 VITALS
BODY MASS INDEX: 28.32 KG/M2 | HEIGHT: 65 IN | TEMPERATURE: 98 F | OXYGEN SATURATION: 98 % | DIASTOLIC BLOOD PRESSURE: 85 MMHG | HEART RATE: 104 BPM | SYSTOLIC BLOOD PRESSURE: 131 MMHG | WEIGHT: 170 LBS

## 2025-02-21 DIAGNOSIS — I10 ESSENTIAL (PRIMARY) HYPERTENSION: ICD-10-CM

## 2025-02-21 PROCEDURE — 99214 OFFICE O/P EST MOD 30 MIN: CPT

## 2025-02-21 PROCEDURE — G2211 COMPLEX E/M VISIT ADD ON: CPT | Mod: NC

## 2025-02-21 PROCEDURE — 93000 ELECTROCARDIOGRAM COMPLETE: CPT

## 2025-05-16 ENCOUNTER — APPOINTMENT (OUTPATIENT)
Dept: CARDIOLOGY | Facility: CLINIC | Age: 35
End: 2025-05-16
Payer: COMMERCIAL

## 2025-05-16 ENCOUNTER — NON-APPOINTMENT (OUTPATIENT)
Age: 35
End: 2025-05-16

## 2025-05-16 VITALS — SYSTOLIC BLOOD PRESSURE: 129 MMHG | DIASTOLIC BLOOD PRESSURE: 79 MMHG

## 2025-05-16 VITALS
SYSTOLIC BLOOD PRESSURE: 145 MMHG | BODY MASS INDEX: 30.49 KG/M2 | HEIGHT: 65 IN | OXYGEN SATURATION: 100 % | WEIGHT: 183 LBS | DIASTOLIC BLOOD PRESSURE: 82 MMHG | HEART RATE: 93 BPM

## 2025-05-16 DIAGNOSIS — R07.89 OTHER CHEST PAIN: ICD-10-CM

## 2025-05-16 DIAGNOSIS — I10 ESSENTIAL (PRIMARY) HYPERTENSION: ICD-10-CM

## 2025-05-16 PROCEDURE — G2211 COMPLEX E/M VISIT ADD ON: CPT | Mod: NC

## 2025-05-16 PROCEDURE — 93000 ELECTROCARDIOGRAM COMPLETE: CPT

## 2025-05-16 PROCEDURE — 99214 OFFICE O/P EST MOD 30 MIN: CPT

## 2025-08-04 ENCOUNTER — NON-APPOINTMENT (OUTPATIENT)
Age: 35
End: 2025-08-04

## 2025-08-20 ENCOUNTER — NON-APPOINTMENT (OUTPATIENT)
Age: 35
End: 2025-08-20

## 2025-08-27 ENCOUNTER — APPOINTMENT (OUTPATIENT)
Dept: FAMILY MEDICINE | Facility: CLINIC | Age: 35
End: 2025-08-27
Payer: COMMERCIAL

## 2025-08-27 VITALS
DIASTOLIC BLOOD PRESSURE: 80 MMHG | HEART RATE: 95 BPM | SYSTOLIC BLOOD PRESSURE: 140 MMHG | OXYGEN SATURATION: 99 % | WEIGHT: 169 LBS | BODY MASS INDEX: 28.12 KG/M2 | TEMPERATURE: 97.7 F

## 2025-08-27 VITALS — DIASTOLIC BLOOD PRESSURE: 80 MMHG | SYSTOLIC BLOOD PRESSURE: 130 MMHG

## 2025-08-27 DIAGNOSIS — J18.9 PNEUMONIA, UNSPECIFIED ORGANISM: ICD-10-CM

## 2025-08-27 PROCEDURE — 99495 TRANSJ CARE MGMT MOD F2F 14D: CPT
